# Patient Record
Sex: FEMALE | Race: WHITE | NOT HISPANIC OR LATINO | Employment: OTHER | URBAN - METROPOLITAN AREA
[De-identification: names, ages, dates, MRNs, and addresses within clinical notes are randomized per-mention and may not be internally consistent; named-entity substitution may affect disease eponyms.]

---

## 2017-03-07 ENCOUNTER — IMPORTED ENCOUNTER (OUTPATIENT)
Dept: URBAN - METROPOLITAN AREA CLINIC 43 | Facility: CLINIC | Age: 67
End: 2017-03-07

## 2017-03-07 PROBLEM — H25.13: Noted: 2017-03-07

## 2017-03-07 PROBLEM — H43.813: Noted: 2017-03-07

## 2017-04-07 ENCOUNTER — IMPORTED ENCOUNTER (OUTPATIENT)
Dept: URBAN - METROPOLITAN AREA CLINIC 43 | Facility: CLINIC | Age: 67
End: 2017-04-07

## 2017-04-07 PROBLEM — H25.13: Noted: 2017-04-07

## 2017-04-07 PROBLEM — H43.813: Noted: 2017-04-07

## 2017-07-26 NOTE — PATIENT DISCUSSION
MOST LIKELY AMD/ERM ARE CONTRIBUTING TO HER DECREASED VA, ALTHOUGH ERM IS NOT SIG ENOUGHT TO [facility].

## 2018-03-26 ENCOUNTER — IMPORTED ENCOUNTER (OUTPATIENT)
Dept: URBAN - METROPOLITAN AREA CLINIC 43 | Facility: CLINIC | Age: 68
End: 2018-03-26

## 2018-03-26 PROBLEM — H25.13: Noted: 2018-03-26

## 2018-03-26 PROBLEM — H43.813: Noted: 2018-03-26

## 2019-04-24 ENCOUNTER — IMPORTED ENCOUNTER (OUTPATIENT)
Dept: URBAN - METROPOLITAN AREA CLINIC 43 | Facility: CLINIC | Age: 69
End: 2019-04-24

## 2019-04-24 PROBLEM — H43.813: Noted: 2019-04-24

## 2019-04-24 PROBLEM — H25.13: Noted: 2019-04-24

## 2019-05-02 NOTE — PROCEDURE NOTE: CLINICAL

## 2019-06-06 NOTE — PROCEDURE NOTE: CLINICAL

## 2019-07-11 NOTE — PROCEDURE NOTE: CLINICAL
PROCEDURE NOTE: Lucentis 0.5mg PFS OS. Diagnosis: Neovascular AMD with Active CNV. Anesthesia: Topical. Prep: Betadine Flush. Prior to injection, risks/benefits/alternatives discussed including but not limited to infection, loss of vision or eye, hemorrhage, cataract, glaucoma, retinal tears or detachment. The patient wished to proceed with treatment. Topical anesthesia was induced with Alcaine. Additional anesthesia was achieved using drop(s) or injection checked above. A drop of Povidone-iodine 5% ophthalmic solution was instilled over the injection site and in the inferior fornix. Betadine prep was performed. A single use prefilled syringe of intravitreal Lucentis 0.5mg/0.05ml was used and excess discarded. The needle was passed 3.0 mm posterior to the limbus in pseudophakic patients, and 3.5 mm posterior to the limbus in phakic patients. The remainder of the Lucentis 0.5mg in the single-use vial was then discarded in a medical waste disposal container. The eye was irrigated with sterile irrigating solution. Patient tolerated the procedure well. There were no complications. Post procedure instructions given. CF vision checked. Injection Time 11:30 AM. The patient was instructed to return for re-evaluation in approximately 4-12 weeks depending on his/her condition and was told to call immediately if vision decreases and/or if his/her eye becomes red, painful, and/or light sensitive. The patient was instructed to go to the emergency room or call 911 if unable to reach the doctor within an hour or two of trying or calling. Rachel Montoya

## 2019-08-12 NOTE — PROCEDURE NOTE: CLINICAL
PROCEDURE NOTE: Lucentis 0.5mg PFS OS. Diagnosis: Neovascular AMD with Active CNV. Anesthesia: Topical. Prep: Betadine Flush. Prior to injection, risks/benefits/alternatives discussed including but not limited to infection, loss of vision or eye, hemorrhage, cataract, glaucoma, retinal tears or detachment. The patient wished to proceed with treatment. Topical anesthesia was induced with Alcaine. Additional anesthesia was achieved using drop(s) or injection checked above. A drop of Povidone-iodine 5% ophthalmic solution was instilled over the injection site and in the inferior fornix. Betadine prep was performed. A single use prefilled syringe of intravitreal Lucentis 0.5mg/0.05ml was used and excess discarded. The needle was passed 3.0 mm posterior to the limbus in pseudophakic patients, and 3.5 mm posterior to the limbus in phakic patients. The remainder of the Lucentis 0.5mg in the single-use vial was then discarded in a medical waste disposal container. The eye was irrigated with sterile irrigating solution. Patient tolerated the procedure well. There were no complications. Post procedure instructions given. CF vision checked. Injection Time *. The patient was instructed to return for re-evaluation in approximately 4-12 weeks depending on his/her condition and was told to call immediately if vision decreases and/or if his/her eye becomes red, painful, and/or light sensitive. The patient was instructed to go to the emergency room or call 911 if unable to reach the doctor within an hour or two of trying or calling. Megan Benjamin

## 2019-09-26 NOTE — PROCEDURE NOTE: CLINICAL
PROCEDURE NOTE: Lucentis 0.5mg PFS OS. Diagnosis: Neovascular AMD with Active CNV. Anesthesia: Topical. Prep: Betadine Flush. Prior to injection, risks/benefits/alternatives discussed including but not limited to infection, loss of vision or eye, hemorrhage, cataract, glaucoma, retinal tears or detachment. The patient wished to proceed with treatment. Topical anesthesia was induced with Alcaine. Additional anesthesia was achieved using drop(s) or injection checked above. A drop of Povidone-iodine 5% ophthalmic solution was instilled over the injection site and in the inferior fornix. Betadine prep was performed. A single use prefilled syringe of intravitreal Lucentis 0.5mg/0.05ml was used and excess discarded. The needle was passed 3.0 mm posterior to the limbus in pseudophakic patients, and 3.5 mm posterior to the limbus in phakic patients. The remainder of the Lucentis 0.5mg in the single-use vial was then discarded in a medical waste disposal container. The eye was irrigated with sterile irrigating solution. Patient tolerated the procedure well. There were no complications. Post procedure instructions given. CF vision checked. Injection Time 10:56 am. The patient was instructed to return for re-evaluation in approximately 4-12 weeks depending on his/her condition and was told to call immediately if vision decreases and/or if his/her eye becomes red, painful, and/or light sensitive. The patient was instructed to go to the emergency room or call 911 if unable to reach the doctor within an hour or two of trying or calling. Yoan Hall

## 2019-11-21 NOTE — PROCEDURE NOTE: CLINICAL
PROCEDURE NOTE: Lucentis 0.5mg PFS OS. Diagnosis: Neovascular AMD with Active CNV. Anesthesia: Topical. Prep: Betadine Flush. Prior to injection, risks/benefits/alternatives discussed including but not limited to infection, loss of vision or eye, hemorrhage, cataract, glaucoma, retinal tears or detachment. The patient wished to proceed with treatment. Topical anesthesia was induced with Alcaine. Additional anesthesia was achieved using drop(s) or injection checked above. A drop of Povidone-iodine 5% ophthalmic solution was instilled over the injection site and in the inferior fornix. Betadine prep was performed. A single use prefilled syringe of intravitreal Lucentis 0.5mg/0.05ml was used and excess discarded. The needle was passed 3.0 mm posterior to the limbus in pseudophakic patients, and 3.5 mm posterior to the limbus in phakic patients. The remainder of the Lucentis 0.5mg in the single-use vial was then discarded in a medical waste disposal container. The eye was irrigated with sterile irrigating solution. Patient tolerated the procedure well. There were no complications. Post procedure instructions given. CF vision checked. Injection Time 11:35 AM. The patient was instructed to return for re-evaluation in approximately 4-12 weeks depending on his/her condition and was told to call immediately if vision decreases and/or if his/her eye becomes red, painful, and/or light sensitive. The patient was instructed to go to the emergency room or call 911 if unable to reach the doctor within an hour or two of trying or calling. Roby Hendrix

## 2020-01-14 NOTE — PROCEDURE NOTE: CLINICAL
PROCEDURE NOTE: Lucentis 0.5mg PFS OS. Diagnosis: Neovascular AMD with Active CNV. Anesthesia: Lidocaine. Prep: Betadine Flush. Prior to injection, risks/benefits/alternatives discussed including but not limited to infection, loss of vision or eye, hemorrhage, cataract, glaucoma, retinal tears or detachment. The patient wished to proceed with treatment. Topical anesthesia was induced with Alcaine. Additional anesthesia was achieved using drop(s) or injection checked above. A drop of Povidone-iodine 5% ophthalmic solution was instilled over the injection site and in the inferior fornix. Betadine prep was performed. A single use prefilled syringe of intravitreal Lucentis 0.5mg/0.05ml was used and excess was disposed of as waste. The needle was passed 3.0 mm posterior to the limbus in pseudophakic patients, and 3.5 mm posterior to the limbus in phakic patients. Injection Time 10:31AM. Patient tolerated the procedure well. There were no complications. The eye was irrigated with sterile irrigating solution. Post procedure instructions given. The patient was instructed to use Artificial Tears q.i.d. p.r.n for comfort. The patient was instructed to return for re-evaluation in approximately 4-12 weeks depending on his/her condition and was told to call immediately if vision decreases and/or if his/her eye becomes red, painful, and/or light sensitive. The patient was instructed to go to the emergency room or call 911 if unable to reach the doctor within an hour or two of trying or calling. Luis Cabrera

## 2020-03-04 NOTE — PATIENT DISCUSSION
New Prescription: prednisoln iy-jyszftnr-lgggmid (prednisoln tx-hmempxiz-cgjvrxj): drops: 1-0.5-0.075% 1 drop three times a day as directed into right eye 03-

## 2020-03-04 NOTE — PATIENT DISCUSSION
DIABETES WITHOUT RETINOPATHY: RETURN FOR FOLLOW-UP AS SCHEDULED FOR DILATED EXAM. DISPLAY PLAN FREE TEXT

## 2020-04-19 ASSESSMENT — TONOMETRY
OS_IOP_MMHG: 15.0
OD_IOP_MMHG: 16.0
OD_IOP_MMHG: 18.0
OD_IOP_MMHG: 18.0
OS_IOP_MMHG: 18.0
OS_IOP_MMHG: 16.0
OD_IOP_MMHG: 16.0
OS_IOP_MMHG: 17.0

## 2020-04-19 ASSESSMENT — VISUAL ACUITY
OS_SC: 20/80
OD_SC: J16
OS_SC: J16
OS_CC: 20/20 -2
OD_SC: 20/70 -2
OS_SC: 20/60 -1
OS_OTHER: 20/400.
OD_CC: J1
OD_CC: 20/25 +2
OD_SC: 20/100
OD_OTHER: 20/400.
OS_OTHER: 20/400.
OD_PH: 20/30 -
OD_SC: J16
OS_CC: 20/25 -2
OS_CC: J1+
OD_SC: 20/200
OS_PH: 20/25 -
OD_CC: 20/20-2
OD_OTHER: 20/400.
OS_SC: J16
OS_CC: J1+2
OD_CC: J1+
OD_CC: 20/30 +2
OS_SC: 20/200
OS_CC: 20/20

## 2020-04-19 ASSESSMENT — KERATOMETRY
OD_K1POWER_DIOPTERS: 44
OS_K2POWER_DIOPTERS: 44.25
OD_AXISANGLE_DEGREES: 180
OD_K1POWER_DIOPTERS: 43.75
OD_AXISANGLE_DEGREES: 90
OS_K2POWER_DIOPTERS: 44.5
OS_AXISANGLE2_DEGREES: 175
OS_K1POWER_DIOPTERS: 44.5
OD_K2POWER_DIOPTERS: 44
OD_K2POWER_DIOPTERS: 43.75
OS_AXISANGLE_DEGREES: 5
OS_K1POWER_DIOPTERS: 44.25
OS_AXISANGLE2_DEGREES: 95
OD_AXISANGLE2_DEGREES: 90
OD_AXISANGLE2_DEGREES: 180
OS_AXISANGLE_DEGREES: 85

## 2020-05-19 NOTE — PROCEDURE NOTE: CLINICAL
PROCEDURE NOTE: Lucentis 0.5mg PFS OS. Diagnosis: Neovascular AMD with Active CNV. Prior to injection, risks/benefits/alternatives discussed including but not limited to infection, loss of vision or eye, hemorrhage, cataract, glaucoma, retinal tears or detachment. The patient wished to proceed with treatment. Topical anesthesia was induced with Alcaine. Additional anesthesia was achieved using drop(s) or injection checked above. A drop of Povidone-iodine 5% ophthalmic solution was instilled over the injection site and in the inferior fornix. Betadine prep was performed. A single use prefilled syringe of intravitreal Lucentis 0.5mg/0.05ml was used and excess was disposed of as waste. The needle was passed 3.0 mm posterior to the limbus in pseudophakic patients, and 3.5 mm posterior to the limbus in phakic patients. Injection Time *. Patient tolerated the procedure well. There were no complications. The eye was irrigated with sterile irrigating solution. Post procedure instructions given. The patient was instructed to use Artificial Tears q.i.d. p.r.n for comfort. The patient was instructed to return for re-evaluation in approximately 4-12 weeks depending on his/her condition and was told to call immediately if vision decreases and/or if his/her eye becomes red, painful, and/or light sensitive. The patient was instructed to go to the emergency room or call 911 if unable to reach the doctor within an hour or two of trying or calling. Natali Olson

## 2020-05-19 NOTE — PATIENT DISCUSSION
LUCENTIS - PROM EDEMA AT 4 MONTHS  - INCREASED - PT COULD NOT COME IN 2ND BEING IN THE HOSPITAL  - RETX AND REEVAL IN 4-6 WKS.

## 2020-06-17 NOTE — PATIENT DISCUSSION
Continue: prednisoln ho-htqcamak-hxqgbmn (prednisoln nj-plknyzbj-qbhnsxz): drops: 1-0.5-0.075% 1 drop three times a day as directed into right eye 03-

## 2020-06-17 NOTE — PATIENT DISCUSSION
S/P PC IOL, OD. STABLE. CONTINUE DROPS AS DIRECTED. RETURN FOR FOLLOW-UP AS SCHEDULED WITH DR. PARKER.

## 2020-06-24 NOTE — PATIENT DISCUSSION
Continue as directed in right eye until first bottle runs out. Start second bottle in left eye  two days prior to surgery and continue as directed.

## 2020-06-24 NOTE — PATIENT DISCUSSION
Continue: prednisoln by-zxjrjhtu-kvcwhge (prednisoln pd-khzqlhml-jnvgyqv): drops: 1-0.5-0.075% 1 drop three times a day as directed into both eyes 03-

## 2020-06-25 ENCOUNTER — ESTABLISHED COMPREHENSIVE EXAM (OUTPATIENT)
Dept: URBAN - METROPOLITAN AREA CLINIC 32 | Facility: CLINIC | Age: 70
End: 2020-06-25

## 2020-06-25 DIAGNOSIS — H10.13: ICD-10-CM

## 2020-06-25 PROCEDURE — 92014 COMPRE OPH EXAM EST PT 1/>: CPT

## 2020-06-25 ASSESSMENT — KERATOMETRY
OD_AXISANGLE_DEGREES: 7
OD_AXISANGLE2_DEGREES: 97
OS_K2POWER_DIOPTERS: 44
OS_K1POWER_DIOPTERS: 43.75
OS_AXISANGLE_DEGREES: 14
OS_AXISANGLE2_DEGREES: 104
OD_K2POWER_DIOPTERS: 44.5
OD_K1POWER_DIOPTERS: 44

## 2020-06-25 ASSESSMENT — VISUAL ACUITY
OS_SC: J16
OS_CC: J1+
OS_SC: 20/200
OD_CC: 20/25
OS_GLARE: 20/400
OD_GLARE: 20/400
OD_CC: J1
OD_SC: J16
OD_SC: 20/200
OS_CC: 20/20

## 2020-06-25 ASSESSMENT — TONOMETRY
OD_IOP_MMHG: 18
OS_IOP_MMHG: 15

## 2020-06-25 NOTE — PROCEDURE NOTE: CLINICAL
PROCEDURE NOTE: Lucentis 0.5mg PFS OS. Diagnosis: Neovascular AMD with Active CNV. Prior to injection, risks/benefits/alternatives discussed including but not limited to infection, loss of vision or eye, hemorrhage, cataract, glaucoma, retinal tears or detachment. The patient wished to proceed with treatment. Topical anesthesia was induced with Alcaine. Additional anesthesia was achieved using drop(s) or injection checked above. A drop of Povidone-iodine 5% ophthalmic solution was instilled over the injection site and in the inferior fornix. Betadine prep was performed. A single use prefilled syringe of intravitreal Lucentis 0.5mg/0.05ml was used and excess was disposed of as waste. The needle was passed 3.0 mm posterior to the limbus in pseudophakic patients, and 3.5 mm posterior to the limbus in phakic patients. Injection Time *. Patient tolerated the procedure well. There were no complications. The eye was irrigated with sterile irrigating solution. Post procedure instructions given. The patient was instructed to use Artificial Tears q.i.d. p.r.n for comfort. The patient was instructed to return for re-evaluation in approximately 4-12 weeks depending on his/her condition and was told to call immediately if vision decreases and/or if his/her eye becomes red, painful, and/or light sensitive. The patient was instructed to go to the emergency room or call 911 if unable to reach the doctor within an hour or two of trying or calling. Pedro Cruz

## 2020-06-25 NOTE — PATIENT DISCUSSION
5 19 20 LUCENTIS - PROM EDEMA AT 4 MONTHS  - INCREASED - PT COULD NOT COME IN 2ND BEING IN THE HOSPITAL  - RETX AND REEVAL IN 4-6 WKS.

## 2020-07-01 NOTE — PATIENT DISCUSSION
Continue as directed in right eye until first bottle runs out. Continue as directed in left eye until 2nd bottle runs out.

## 2020-07-01 NOTE — PATIENT DISCUSSION
Continue: prednisoln js-thekqsld-wucyqvp (prednisoln yl-zorqgvlt-tmjpise): drops: 1-0.5-0.075% 1 drop three times a day as directed into both eyes 03-

## 2020-07-01 NOTE — PATIENT DISCUSSION
S/P PC IOL, OS: GOOD POST OP RESULT. CONTINUE COMBINATION DROP UNTIL BOTTLE RUNS OUT. RETURN FOR FOLLOW-UP IN 2 WEEKS.

## 2020-07-01 NOTE — PATIENT DISCUSSION
Post-Op Instructions: The patient was instructed in the proper use of post-operative eye drops continue as directed. Call back instructions, retinal detachment and endophthalmitis precautions given.

## 2020-08-25 NOTE — PROCEDURE NOTE: CLINICAL
PROCEDURE NOTE: Lucentis 0.5mg PFS OS. Diagnosis: Neovascular AMD with Active CNV. Prior to injection, risks/benefits/alternatives discussed including but not limited to infection, loss of vision or eye, hemorrhage, cataract, glaucoma, retinal tears or detachment. The patient wished to proceed with treatment. Topical anesthesia was induced with Alcaine. Additional anesthesia was achieved using drop(s) or injection checked above. A drop of Povidone-iodine 5% ophthalmic solution was instilled over the injection site and in the inferior fornix. Betadine prep was performed. A single use prefilled syringe of intravitreal Lucentis 0.5mg/0.05ml was used and excess was disposed of as waste. The needle was passed 3.0 mm posterior to the limbus in pseudophakic patients, and 3.5 mm posterior to the limbus in phakic patients. Injection Time 10:45AM. Patient tolerated the procedure well. There were no complications. The eye was irrigated with sterile irrigating solution. Post procedure instructions given. The patient was instructed to use Artificial Tears q.i.d. p.r.n for comfort. The patient was instructed to return for re-evaluation in approximately 4-12 weeks depending on his/her condition and was told to call immediately if vision decreases and/or if his/her eye becomes red, painful, and/or light sensitive. The patient was instructed to go to the emergency room or call 911 if unable to reach the doctor within an hour or two of trying or calling. Regina Stacy

## 2020-10-22 NOTE — PROCEDURE NOTE: CLINICAL
PROCEDURE NOTE: Lucentis 0.5mg PFS OS. Diagnosis: Neovascular AMD with Active CNV. Prior to injection, risks/benefits/alternatives discussed including but not limited to infection, loss of vision or eye, hemorrhage, cataract, glaucoma, retinal tears or detachment. The patient wished to proceed with treatment. Topical anesthesia was induced with Alcaine. Additional anesthesia was achieved using drop(s) or injection checked above. A drop of Povidone-iodine 5% ophthalmic solution was instilled over the injection site and in the inferior fornix. Betadine prep was performed. A single use prefilled syringe of intravitreal Lucentis 0.5mg/0.05ml was used and excess was disposed of as waste. The needle was passed 3.0 mm posterior to the limbus in pseudophakic patients, and 3.5 mm posterior to the limbus in phakic patients. Injection Time 10:55AM. Patient tolerated the procedure well. There were no complications. The eye was irrigated with sterile irrigating solution. Post procedure instructions given. The patient was instructed to use Artificial Tears q.i.d. p.r.n for comfort. The patient was instructed to return for re-evaluation in approximately 4-12 weeks depending on his/her condition and was told to call immediately if vision decreases and/or if his/her eye becomes red, painful, and/or light sensitive. The patient was instructed to go to the emergency room or call 911 if unable to reach the doctor within an hour or two of trying or calling. Inez Minor

## 2020-11-11 NOTE — PATIENT DISCUSSION
My findings and recommendations TODAY are based on the patient's symptoms, exam, diagnostic testing and records. Yes

## 2020-11-19 NOTE — PATIENT DISCUSSION
If no improvement after 2 weeks, Patient instructed to see DR Dimple Alvarez for further evaluation.

## 2020-12-15 NOTE — PATIENT DISCUSSION
If no improvement after 2 weeks, Patient instructed to see DR Dereck Haskins for further evaluation.

## 2020-12-15 NOTE — PROCEDURE NOTE: CLINICAL

## 2021-01-14 NOTE — PROCEDURE NOTE: CLINICAL
PROCEDURE NOTE: Eylea PFS OD. Diagnosis: Neovascular AMD with Active CNV. Anesthesia: Topical. Prep: Betadine Drops. Prior to injection, risks/benefits/alternatives discussed including but not limited to infection, loss of vision or eye, hemorrhage, cataract, glaucoma, retinal tears or detachment. The patient wished to proceed with treatment. Betadine prep was performed. Topical anesthesia was induced with Alcaine. Additional anesthesia was achieved using drop(s) or injection checked above. A drop of Povidone-iodine 5% ophthalmic solution was instilled over the injection site and in the inferior fornix. A single use prefilled syringe of intravitreal Eylea 2mg/0.05ml was used and excess was disposed of as waste. The needle was passed 3.0 mm posterior to the limbus in pseudophakic patients, and 3.5 mm posterior to the limbus in phakic patients. Injection time: 1155A. Patient tolerated procedure well. There were no complications. The eye was irrigated with sterile irrigating solution. Post procedure instructions given. The patient was instructed to use Artificial Tears q.i.d. p.r.n for comfort. The patient was instructed to return for re-evaluation in approximately 4-12 weeks depending on his/her condition and was told to call immediately if vision decreases and/or if his/her eye becomes red, painful, and/or light sensitive. The patient was instructed to go to the emergency room or call 911 if unable to reach the doctor within an hour or two of trying or calling. Gabriela Álvarez

## 2021-01-14 NOTE — PATIENT DISCUSSION
If no improvement after 2 weeks, Patient instructed to see DR Zoraida Ramirez for further evaluation.

## 2021-02-18 NOTE — PROCEDURE NOTE: CLINICAL
PROCEDURE NOTE: Lucentis 0.5mg PFS OS. Diagnosis: Neovascular AMD with Active CNV. Anesthesia: Subconjunctival. Prep: Betadine Flush. Prior to injection, risks/benefits/alternatives discussed including but not limited to infection, loss of vision or eye, hemorrhage, cataract, glaucoma, retinal tears or detachment. The patient wished to proceed with treatment. Topical anesthesia was induced with Alcaine. Additional anesthesia was achieved using drop(s) or injection checked above. A drop of Povidone-iodine 5% ophthalmic solution was instilled over the injection site and in the inferior fornix. Betadine prep was performed. A single use prefilled syringe of intravitreal Lucentis 0.5mg/0.05ml was used and excess was disposed of as waste. The needle was passed 3.0 mm posterior to the limbus in pseudophakic patients, and 3.5 mm posterior to the limbus in phakic patients. Injection Time *. Patient tolerated the procedure well. There were no complications. The eye was irrigated with sterile irrigating solution. Post procedure instructions given. The patient was instructed to use Artificial Tears q.i.d. p.r.n for comfort. The patient was instructed to return for re-evaluation in approximately 4-12 weeks depending on his/her condition and was told to call immediately if vision decreases and/or if his/her eye becomes red, painful, and/or light sensitive. The patient was instructed to go to the emergency room or call 911 if unable to reach the doctor within an hour or two of trying or calling. Vivianasevero Flores PROCEDURE NOTE: Eylea PFS OD. Diagnosis: Neovascular AMD with Active CNV. Anesthesia: Topical. Prep: Betadine Drops. Prior to injection, risks/benefits/alternatives discussed including but not limited to infection, loss of vision or eye, hemorrhage, cataract, glaucoma, retinal tears or detachment. The patient wished to proceed with treatment. Betadine prep was performed. Topical anesthesia was induced with Alcaine. Additional anesthesia was achieved using drop(s) or injection checked above. A drop of Povidone-iodine 5% ophthalmic solution was instilled over the injection site and in the inferior fornix. A single use prefilled syringe of intravitreal Eylea 2mg/0.05ml was used and excess was disposed of as waste. The needle was passed 3.0 mm posterior to the limbus in pseudophakic patients, and 3.5 mm posterior to the limbus in phakic patients. Injection time: 12:00PM.  Patient tolerated procedure well. There were no complications. The eye was irrigated with sterile irrigating solution. Post procedure instructions given. The patient was instructed to use Artificial Tears q.i.d. p.r.n for comfort. The patient was instructed to return for re-evaluation in approximately 4-12 weeks depending on his/her condition and was told to call immediately if vision decreases and/or if his/her eye becomes red, painful, and/or light sensitive. The patient was instructed to go to the emergency room or call 911 if unable to reach the doctor within an hour or two of trying or calling. Sudheer Flores

## 2021-04-01 NOTE — PROCEDURE NOTE: CLINICAL
PROCEDURE NOTE: Eylea PFS OD. Diagnosis: Neovascular AMD with Active CNV. Anesthesia: Subconjunctival. Prep: Betadine Drops. Prior to injection, risks/benefits/alternatives discussed including but not limited to infection, loss of vision or eye, hemorrhage, cataract, glaucoma, retinal tears or detachment. The patient wished to proceed with treatment. Betadine prep was performed. Topical anesthesia was induced with Alcaine. Additional anesthesia was achieved using drop(s) or injection checked above. A drop of Povidone-iodine 5% ophthalmic solution was instilled over the injection site and in the inferior fornix. A single use prefilled syringe of intravitreal Eylea 2mg/0.05ml was used and excess was disposed of as waste. The needle was passed 3.0 mm posterior to the limbus in pseudophakic patients, and 3.5 mm posterior to the limbus in phakic patients. Injection time: 9:30 AM.  Patient tolerated procedure well. There were no complications. The eye was irrigated with sterile irrigating solution. Post procedure instructions given. The patient was instructed to use Artificial Tears q.i.d. p.r.n for comfort. The patient was instructed to return for re-evaluation in approximately 4-12 weeks depending on his/her condition and was told to call immediately if vision decreases and/or if his/her eye becomes red, painful, and/or light sensitive. The patient was instructed to go to the emergency room or call 911 if unable to reach the doctor within an hour or two of trying or calling. Rosita Rich PROCEDURE NOTE: Lucentis 0.5mg PFS OS. Diagnosis: Neovascular AMD with Active CNV. Anesthesia: Subconjunctival. Prep: Betadine Flush. Prior to injection, risks/benefits/alternatives discussed including but not limited to infection, loss of vision or eye, hemorrhage, cataract, glaucoma, retinal tears or detachment. The patient wished to proceed with treatment. Topical anesthesia was induced with Alcaine. Additional anesthesia was achieved using drop(s) or injection checked above. A drop of Povidone-iodine 5% ophthalmic solution was instilled over the injection site and in the inferior fornix. Betadine prep was performed. A single use prefilled syringe of intravitreal Lucentis 0.5mg/0.05ml was used and excess was disposed of as waste. The needle was passed 3.0 mm posterior to the limbus in pseudophakic patients, and 3.5 mm posterior to the limbus in phakic patients. Injection Time 9:45 AM. Patient tolerated the procedure well. There were no complications. The eye was irrigated with sterile irrigating solution. Post procedure instructions given. The patient was instructed to use Artificial Tears q.i.d. p.r.n for comfort. The patient was instructed to return for re-evaluation in approximately 4-12 weeks depending on his/her condition and was told to call immediately if vision decreases and/or if his/her eye becomes red, painful, and/or light sensitive. The patient was instructed to go to the emergency room or call 911 if unable to reach the doctor within an hour or two of trying or calling. Rosita Rich

## 2021-04-01 NOTE — PATIENT DISCUSSION
If no improvement after 2 weeks, Patient instructed to see DR Johnny Waldrop for further evaluation.

## 2021-04-01 NOTE — PATIENT DISCUSSION
LUCENTIS AND EXTEND TO 7 AND REPEAT X 2 DOI  - TRACE EDEMA AT 6 WK - TX AND EXTEND  TO 7 WKS AS PT DOING BETTER WITH SHORTER F/U.  TO TRY AN EXTRA WEEK EXTENSION.

## 2021-05-20 NOTE — PROCEDURE NOTE: CLINICAL
PROCEDURE NOTE: Eylea PFS OD. Diagnosis: Neovascular AMD with Active CNV. Anesthesia: Topical/Subconjunctival. Prep: Betadine Drops. Prior to injection, risks/benefits/alternatives discussed including but not limited to infection, loss of vision or eye, hemorrhage, cataract, glaucoma, retinal tears or detachment. The patient wished to proceed with treatment. Betadine prep was performed. Topical anesthesia was induced with Alcaine. Additional anesthesia was achieved using drop(s) or injection checked above. A drop of Povidone-iodine 5% ophthalmic solution was instilled over the injection site and in the inferior fornix. A single use prefilled syringe of intravitreal Eylea 2mg/0.05ml was used and excess was disposed of as waste. The needle was passed 3.0 mm posterior to the limbus in pseudophakic patients, and 3.5 mm posterior to the limbus in phakic patients. Injection time: 230PM.  Patient tolerated procedure well. There were no complications. The eye was irrigated with sterile irrigating solution. Post procedure instructions given. The patient was instructed to use Artificial Tears q.i.d. p.r.n for comfort. The patient was instructed to return for re-evaluation in approximately 4-12 weeks depending on his/her condition and was told to call immediately if vision decreases and/or if his/her eye becomes red, painful, and/or light sensitive. The patient was instructed to go to the emergency room or call 911 if unable to reach the doctor within an hour or two of trying or calling. Rosita Rich PROCEDURE NOTE: Lucentis 0.5mg PFS OS. Diagnosis: Neovascular AMD with Active CNV. Anesthesia: Lidocaine. Prep: Betadine Flush. Prior to injection, risks/benefits/alternatives discussed including but not limited to infection, loss of vision or eye, hemorrhage, cataract, glaucoma, retinal tears or detachment. The patient wished to proceed with treatment. Topical anesthesia was induced with Alcaine. Additional anesthesia was achieved using drop(s) or injection checked above. A drop of Povidone-iodine 5% ophthalmic solution was instilled over the injection site and in the inferior fornix. Betadine prep was performed. A single use prefilled syringe of intravitreal Lucentis 0.5mg/0.05ml was used and excess was disposed of as waste. The needle was passed 3.0 mm posterior to the limbus in pseudophakic patients, and 3.5 mm posterior to the limbus in phakic patients. Injection Time 330. Patient tolerated the procedure well. There were no complications. The eye was irrigated with sterile irrigating solution. Post procedure instructions given. The patient was instructed to use Artificial Tears q.i.d. p.r.n for comfort. The patient was instructed to return for re-evaluation in approximately 4-12 weeks depending on his/her condition and was told to call immediately if vision decreases and/or if his/her eye becomes red, painful, and/or light sensitive. The patient was instructed to go to the emergency room or call 911 if unable to reach the doctor within an hour or two of trying or calling. Rosita Rich

## 2021-05-20 NOTE — PATIENT DISCUSSION
LUCENTIS AND EXTEND TO 7 AND REPEAT X 2  - TRACE EDEMA AT 6 WK - TX AND EXTEND  TO 7 WKS AS PT DOING BETTER WITH SHORTER F/U.  TO TRY AN EXTRA WEEK EXTENSION.

## 2021-05-20 NOTE — PATIENT DISCUSSION
If no improvement after 2 weeks, Patient instructed to see DR Beatrice Jaime for further evaluation.

## 2021-07-07 NOTE — PROCEDURE NOTE: CLINICAL
PROCEDURE NOTE: Eylea PFS OD. Diagnosis: Neovascular AMD with Active CNV. Anesthesia: Subconjunctival. Prep: Betadine Drops. Prior to injection, risks/benefits/alternatives discussed including but not limited to infection, loss of vision or eye, hemorrhage, cataract, glaucoma, retinal tears or detachment. The patient wished to proceed with treatment. Betadine prep was performed. Topical anesthesia was induced with Alcaine. Additional anesthesia was achieved using drop(s) or injection checked above. A drop of Povidone-iodine 5% ophthalmic solution was instilled over the injection site and in the inferior fornix. A single use prefilled syringe of intravitreal Eylea 2mg/0.05ml was used and excess was disposed of as waste. The needle was passed 3.0 mm posterior to the limbus in pseudophakic patients, and 3.5 mm posterior to the limbus in phakic patients. Injection time: 906AM.  Patient tolerated procedure well. There were no complications. The eye was irrigated with sterile irrigating solution. Post procedure instructions given. The patient was instructed to use Artificial Tears q.i.d. p.r.n for comfort. The patient was instructed to return for re-evaluation in approximately 4-12 weeks depending on his/her condition and was told to call immediately if vision decreases and/or if his/her eye becomes red, painful, and/or light sensitive. The patient was instructed to go to the emergency room or call 911 if unable to reach the doctor within an hour or two of trying or calling. Pio Calvin PROCEDURE NOTE: Lucentis 0.5mg PFS OS. Diagnosis: Neovascular AMD with Active CNV. Anesthesia: Subconjunctival. Prep: Betadine Flush. Prior to injection, risks/benefits/alternatives discussed including but not limited to infection, loss of vision or eye, hemorrhage, cataract, glaucoma, retinal tears or detachment. The patient wished to proceed with treatment. Topical anesthesia was induced with Alcaine. Additional anesthesia was achieved using drop(s) or injection checked above. A drop of Povidone-iodine 5% ophthalmic solution was instilled over the injection site and in the inferior fornix. Betadine prep was performed. A single use prefilled syringe of intravitreal Lucentis 0.5mg/0.05ml was used and excess was disposed of as waste. The needle was passed 3.0 mm posterior to the limbus in pseudophakic patients, and 3.5 mm posterior to the limbus in phakic patients. Injection Time 905AM. Patient tolerated the procedure well. There were no complications. The eye was irrigated with sterile irrigating solution. Post procedure instructions given. The patient was instructed to use Artificial Tears q.i.d. p.r.n for comfort. The patient was instructed to return for re-evaluation in approximately 4-12 weeks depending on his/her condition and was told to call immediately if vision decreases and/or if his/her eye becomes red, painful, and/or light sensitive. The patient was instructed to go to the emergency room or call 911 if unable to reach the doctor within an hour or two of trying or calling. Pio Calvin

## 2021-09-07 NOTE — PROCEDURE NOTE: CLINICAL
PROCEDURE NOTE: Eylea PFS OD. Diagnosis: Neovascular AMD with Active CNV. Anesthesia: Topical/Subconjunctival. Prep: Betadine Drops. Prior to injection, risks/benefits/alternatives discussed including but not limited to infection, loss of vision or eye, hemorrhage, cataract, glaucoma, retinal tears or detachment. The patient wished to proceed with treatment. Betadine prep was performed. Topical anesthesia was induced with Alcaine. Additional anesthesia was achieved using drop(s) or injection checked above. A drop of Povidone-iodine 5% ophthalmic solution was instilled over the injection site and in the inferior fornix. A single use prefilled syringe of intravitreal Eylea 2mg/0.05ml was used and excess was disposed of as waste. The needle was passed 3.0 mm posterior to the limbus in pseudophakic patients, and 3.5 mm posterior to the limbus in phakic patients. Injection time: 12:30 PM.  Patient tolerated procedure well. There were no complications. The eye was irrigated with sterile irrigating solution. Post procedure instructions given. The patient was instructed to use Artificial Tears q.i.d. p.r.n for comfort. The patient was instructed to return for re-evaluation in approximately 4-12 weeks depending on his/her condition and was told to call immediately if vision decreases and/or if his/her eye becomes red, painful, and/or light sensitive. The patient was instructed to go to the emergency room or call 911 if unable to reach the doctor within an hour or two of trying or calling. Suzy Modi PROCEDURE NOTE: Lucentis 0.5mg PFS OS. Diagnosis: Neovascular AMD with Active CNV. Anesthesia: Lidocaine 4%. Prep: Betadine Flush. Prior to injection, risks/benefits/alternatives discussed including but not limited to infection, loss of vision or eye, hemorrhage, cataract, glaucoma, retinal tears or detachment. The patient wished to proceed with treatment. Topical anesthesia was induced with Alcaine. Additional anesthesia was achieved using drop(s) or injection checked above. A drop of Povidone-iodine 5% ophthalmic solution was instilled over the injection site and in the inferior fornix. Betadine prep was performed. A single use prefilled syringe of intravitreal Lucentis 0.5mg/0.05ml was used and excess was disposed of as waste. The needle was passed 3.0 mm posterior to the limbus in pseudophakic patients, and 3.5 mm posterior to the limbus in phakic patients. Injection Time 12:25 PM. Patient tolerated the procedure well. There were no complications. The eye was irrigated with sterile irrigating solution. Post procedure instructions given. The patient was instructed to use Artificial Tears q.i.d. p.r.n for comfort. The patient was instructed to return for re-evaluation in approximately 4-12 weeks depending on his/her condition and was told to call immediately if vision decreases and/or if his/her eye becomes red, painful, and/or light sensitive. The patient was instructed to go to the emergency room or call 911 if unable to reach the doctor within an hour or two of trying or calling. Suzy Modi

## 2021-09-07 NOTE — PATIENT DISCUSSION
7 9 21 TRACE EDEMA 9 WKS - RETX AND REDUCE BACK TO 7 WKS TO TRY AND CLEAR REMAINING EDEMA AND SINCE OD DOING WORSE W/ LONGER F/U.

## 2021-10-26 NOTE — PATIENT DISCUSSION
Patient Education     MEDICATION: ORAL CONTRACEPTIVE PILLS  Oral contraceptives (birth control pills) are used most often to prevent pregnancy or to regulate menstrual cycles. There are many brand names sold: Lo-Ovral, Ovral, Ortho-Novum, Norinyl, Loestrin, and others. They work by preventing the release of eggs from your ovaries. There are two types: combination pills (containing estrogen and progestin) and progestin-only pills. Birth control pills do not protect against HIV infection (the virus that causes AIDS) or other sexually transmitted diseases.  DIRECTIONS FOR USE:  1. The pills are provided in 21- and 28-day packages. The day you take the first pill is important. Read the directions that come with the pills or follow your doctor’s advice.  2. Pills are taken by mouth once a day. With 21-day packs, wait seven days after the last pill before starting a new pack. With the 28-day pack, take the pills daily without skipping days. Take the pill at the same time each day to help you remember (for example, when you wake up or when you go to bed).  3. Refill your prescription soon enough so you always have a new package available. Do not take the pill for more than 12 months without consulting a doctor.  4. Do not rely on birth control pills to prevent pregnancy during the first month that you use them. You must use another method of contraception during this time.  5. While taking birth control pills, you should have regular physical exams. Have them at least once a year or as often as your healthcare provider suggests.  6. When you stop birth control pills, you should use another method of contraception for at least two months before trying to get pregnant.  7. For combination pills, if you forget a dose, take two pills the day you remember. If you miss two doses, take two tablets daily for the next two days. Whenever you miss one or two pills, you must use another method of birth control until this packet is  IMPROVED 1 14 21. finished. If you forget three or more tablets, do not take any extra pills. Throw away the current packet, and start a new packet seven days after you took your last pill. Use another method of birth control until you have taken 14 pills from the new packet. For progestin-only pills, each pill must be taken within 3 hours of scheduled time. If it is over 3 hours, take the pill ASAP and take second pill at next scheduled time. A backup contraceptive should be used for at least 48 hours.  8. If you miss a period and you have taken all your pills on schedule, continue taking the pills. If you miss two periods in a row or if you missed one period and did not take your pills on schedule during the previous cycle, stop taking the pills and use another method of birth control until you have a pregnancy test. (Birth control pills may cause birth defects if taken during early pregnancy.)  9. If surgery is planned, notify your doctor that you are taking birth control pills. Stop taking them and use another method of birth control for one week before surgery to avoid increased risk of a blood clot.  WHAT TO WATCH FOR:  POSSIBLE MILD SIDE EFFECTS: Headache, nausea (Take the pill with food). Dizziness, breast tenderness, spotting or bleeding between periods are common when starting birth control pills and should stop after 3-6 cycles. (Contact your doctor or this facility if these symptoms do not go away after four months.)  POSSIBLE SERIOUS SIDE EFFECTS: Abdominal pain, swelling or pain in the legs, severe sharp pressure or pain in the chest, shortness of breath, coughing up blood, severe headache, sudden visual changes, unexpected heavy vaginal bleeding: Contact your doctor or return to this facility at once. Two missed periods in a row, lumps in the breast, unexpected weight gain: Contact your doctor.  Birth control pills cause a slight increase in risk of stroke, blood clots, high blood pressure, heart attack, gallbladder  disease, and vision problems. Cigarette smoking and being over 35 years of age further increase these risks.  While birth control pills may protect against cancer of the ovary and uterus, they may cause an increased risk for cancer of the cervix. Latest studies show that birth control pills do not increase the risk for breast cancer.  ---------- IMPORTANT ----------  MEDICAL CONDITIONS: Before starting this medicine, be sure your doctor knows if you have any of the following conditions:  · Pregnant or breastfeeding, asthma, high blood pressure, kidney, liver or heart disease, high blood pressure during pregnancy, severe fluid retention during your period, blood clots, heart attack, seizure, migraine headaches, breast cancer, high cholesterol, depression  DRUG INTERACTIONS: Before starting this medicine, be sure your doctor knows if you are taking any of the following drugs:  · Tegretol (carbamazepine), phenobarbital, Dilantin (phenytoin), rifampin (may decrease the effect of the birth control pills)  · Penicillin, ampicillin, amoxicillin, augmentin, sulfa drugs, tetracycline  · Coumadin (warfarin) may require an adjustment in the dose.  WARNINGS:  · If you have been prescribed penicillin, ampicillin, sulfa drugs, or tetracycline for an injection, these antibiotics may decrease the effect of birth control pills. Ask your doctor for a different antibiotic or use another method of birth control for the menstrual cycle(s) in which you take this medicine.  [NOTE: This information topic may not include all directions, precautions, medical conditions, drug/food interactions and warnings for this drug. Check with your doctor, nurse, or pharmacist for any questions that you may have.]  © 6963-3166 [facility], [address] [place] [address], [place] [address]. All rights reserved. This information is not intended as a substitute for professional medical care. Always follow your healthcare professional's instructions.

## 2021-10-26 NOTE — PROCEDURE NOTE: CLINICAL
PROCEDURE NOTE: Eylea PFS OD. Diagnosis: Neovascular AMD with Active CNV. Anesthesia: Topical/Subconjunctival. Prep: Betadine Drops. Prior to injection, risks/benefits/alternatives discussed including but not limited to infection, loss of vision or eye, hemorrhage, cataract, glaucoma, retinal tears or detachment. The patient wished to proceed with treatment. Betadine prep was performed. Topical anesthesia was induced with Alcaine. Additional anesthesia was achieved using drop(s) or injection checked above. A drop of Povidone-iodine 5% ophthalmic solution was instilled over the injection site and in the inferior fornix. A single use prefilled syringe of intravitreal Eylea 2mg/0.05ml was used and excess was disposed of as waste. The needle was passed 3.0 mm posterior to the limbus in pseudophakic patients, and 3.5 mm posterior to the limbus in phakic patients. Injection time: 10:00 AM.  Patient tolerated procedure well. There were no complications. The eye was irrigated with sterile irrigating solution. Post procedure instructions given. The patient was instructed to use Artificial Tears q.i.d. p.r.n for comfort. The patient was instructed to return for re-evaluation in approximately 4-12 weeks depending on his/her condition and was told to call immediately if vision decreases and/or if his/her eye becomes red, painful, and/or light sensitive. The patient was instructed to go to the emergency room or call 911 if unable to reach the doctor within an hour or two of trying or calling. Pio Calvin PROCEDURE NOTE: Lucentis 0.5mg PFS OS. Diagnosis: Neovascular AMD with Active CNV. Anesthesia: Topical/Subconjunctival. Prep: Betadine Flush. Prior to injection, risks/benefits/alternatives discussed including but not limited to infection, loss of vision or eye, hemorrhage, cataract, glaucoma, retinal tears or detachment. The patient wished to proceed with treatment. Topical anesthesia was induced with Alcaine. Additional anesthesia was achieved using drop(s) or injection checked above. A drop of Povidone-iodine 5% ophthalmic solution was instilled over the injection site and in the inferior fornix. Betadine prep was performed. A single use prefilled syringe of intravitreal Lucentis 0.5mg/0.05ml was used and excess was disposed of as waste. The needle was passed 3.0 mm posterior to the limbus in pseudophakic patients, and 3.5 mm posterior to the limbus in phakic patients. Injection Time 10:00 AM. Patient tolerated the procedure well. There were no complications. The eye was irrigated with sterile irrigating solution. Post procedure instructions given. The patient was instructed to use Artificial Tears q.i.d. p.r.n for comfort. The patient was instructed to return for re-evaluation in approximately 4-12 weeks depending on his/her condition and was told to call immediately if vision decreases and/or if his/her eye becomes red, painful, and/or light sensitive. The patient was instructed to go to the emergency room or call 911 if unable to reach the doctor within an hour or two of trying or calling. Pio Calvin

## 2021-12-14 NOTE — PROCEDURE NOTE: CLINICAL
PROCEDURE NOTE: Eylea PFS OD. Diagnosis: Neovascular AMD with Active CNV. Anesthesia: Lidocaine 4%. Prep: Betadine Drops. Prior to injection, risks/benefits/alternatives discussed including but not limited to infection, loss of vision or eye, hemorrhage, cataract, glaucoma, retinal tears or detachment. The patient wished to proceed with treatment. Betadine prep was performed. Topical anesthesia was induced with Alcaine. Additional anesthesia was achieved using drop(s) or injection checked above. A drop of Povidone-iodine 5% ophthalmic solution was instilled over the injection site and in the inferior fornix. A single use prefilled syringe of intravitreal Eylea 2mg/0.05ml was used and excess was disposed of as waste. The needle was passed 3.0 mm posterior to the limbus in pseudophakic patients, and 3.5 mm posterior to the limbus in phakic patients. Injection time: 10:15AM.  Patient tolerated procedure well. There were no complications. The eye was irrigated with sterile irrigating solution. Post procedure instructions given. The patient was instructed to use Artificial Tears q.i.d. p.r.n for comfort. The patient was instructed to return for re-evaluation in approximately 4-12 weeks depending on his/her condition and was told to call immediately if vision decreases and/or if his/her eye becomes red, painful, and/or light sensitive. The patient was instructed to go to the emergency room or call 911 if unable to reach the doctor within an hour or two of trying or calling. Michael Velazco PROCEDURE NOTE: Lucentis 0.5mg PFS OS. Diagnosis: Neovascular AMD with Active CNV. Anesthesia: Lidocaine 4%. Prep: Betadine Flush. Prior to injection, risks/benefits/alternatives discussed including but not limited to infection, loss of vision or eye, hemorrhage, cataract, glaucoma, retinal tears or detachment. The patient wished to proceed with treatment. Topical anesthesia was induced with Alcaine. Additional anesthesia was achieved using drop(s) or injection checked above. A drop of Povidone-iodine 5% ophthalmic solution was instilled over the injection site and in the inferior fornix. Betadine prep was performed. A single use prefilled syringe of intravitreal Lucentis 0.5mg/0.05ml was used and excess was disposed of as waste. The needle was passed 3.0 mm posterior to the limbus in pseudophakic patients, and 3.5 mm posterior to the limbus in phakic patients. Injection Time 10:15AM. Patient tolerated the procedure well. There were no complications. The eye was irrigated with sterile irrigating solution. Post procedure instructions given. The patient was instructed to use Artificial Tears q.i.d. p.r.n for comfort. The patient was instructed to return for re-evaluation in approximately 4-12 weeks depending on his/her condition and was told to call immediately if vision decreases and/or if his/her eye becomes red, painful, and/or light sensitive. The patient was instructed to go to the emergency room or call 911 if unable to reach the doctor within an hour or two of trying or calling. Michael Velazco

## 2022-02-01 NOTE — PROCEDURE NOTE: CLINICAL
PROCEDURE NOTE: Eylea PFS OD. Diagnosis: Neovascular AMD with Active CNV. Anesthesia: Lidocaine 4%. Prep: Betadine Drops. Prior to injection, risks/benefits/alternatives discussed including but not limited to infection, loss of vision or eye, hemorrhage, cataract, glaucoma, retinal tears or detachment. The patient wished to proceed with treatment. Betadine prep was performed. Topical anesthesia was induced with Alcaine. Additional anesthesia was achieved using drop(s) or injection checked above. A drop of Povidone-iodine 5% ophthalmic solution was instilled over the injection site and in the inferior fornix. A single use prefilled syringe of intravitreal Eylea 2mg/0.05ml was used and excess was disposed of as waste. The needle was passed 3.0 mm posterior to the limbus in pseudophakic patients, and 3.5 mm posterior to the limbus in phakic patients. Injection time: 9:00 AM.  Patient tolerated procedure well. There were no complications. The eye was irrigated with sterile irrigating solution. Post procedure instructions given. The patient was instructed to use Artificial Tears q.i.d. p.r.n for comfort. The patient was instructed to return for re-evaluation in approximately 4-12 weeks depending on his/her condition and was told to call immediately if vision decreases and/or if his/her eye becomes red, painful, and/or light sensitive. The patient was instructed to go to the emergency room or call 911 if unable to reach the doctor within an hour or two of trying or calling. Suzy Modi PROCEDURE NOTE: Lucentis 0.5mg PFS OS. Diagnosis: Neovascular AMD with Active CNV. Anesthesia: Lidocaine 4%. Prep: Betadine Flush. Prior to injection, risks/benefits/alternatives discussed including but not limited to infection, loss of vision or eye, hemorrhage, cataract, glaucoma, retinal tears or detachment. The patient wished to proceed with treatment. Topical anesthesia was induced with Alcaine. Additional anesthesia was achieved using drop(s) or injection checked above. A drop of Povidone-iodine 5% ophthalmic solution was instilled over the injection site and in the inferior fornix. Betadine prep was performed. A single use prefilled syringe of intravitreal Lucentis 0.5mg/0.05ml was used and excess was disposed of as waste. The needle was passed 3.0 mm posterior to the limbus in pseudophakic patients, and 3.5 mm posterior to the limbus in phakic patients. Injection Time 9:00 AM. Patient tolerated the procedure well. There were no complications. The eye was irrigated with sterile irrigating solution. Post procedure instructions given. The patient was instructed to use Artificial Tears q.i.d. p.r.n for comfort. The patient was instructed to return for re-evaluation in approximately 4-12 weeks depending on his/her condition and was told to call immediately if vision decreases and/or if his/her eye becomes red, painful, and/or light sensitive. The patient was instructed to go to the emergency room or call 911 if unable to reach the doctor within an hour or two of trying or calling. Suzy Modi

## 2022-02-01 NOTE — PATIENT DISCUSSION
If no improvement after 2 weeks, Patient instructed to see DR Ara Hutchinson for further evaluation.

## 2022-02-16 NOTE — PATIENT DISCUSSION
"2 16 22 ""STEADY ACHE"" IF I TOUCH IT HURTS - PROB 2ND HUMA - USES REFRESH ""NOT VERY OFTEN"" - INCREASE TO 4-5 X A DAY.  HAS SENSITIVITY ON SUP TEMP PORTION OF HER EYE - IF IT DOES NOT IMPROVE IN 1 WK AFTER AT TO GIVE RX FOR  TRIAL OF LOTEMAX EYE DROPS - NO SIG OF CELLS OR INJECTION TO SUGGEST INFLAMMATION OTHER THAN 2ND HUMA. "

## 2022-02-16 NOTE — PATIENT DISCUSSION
If no improvement after 2 weeks, Patient instructed to see DR Eleazar Pittman for further evaluation.

## 2022-03-04 NOTE — PATIENT DISCUSSION
3 4 22 NO OCULAR ETIOLOGY - LID FLIPPED AND NO SIGN LID DISEASE - HAS BEEN PAINFUL EVERY SINCE SHE RECIEVED EYLEA TX 2 1 22 - NO IMPROVEMENT WITH LOTEMAX OR AT - RECOM [facility] W/ DR Jazzmine Bautista AND PT TO STOP LOTEMAX - HAS PERIPHERAL NEUROPATHY ? RELATED TO EYE PAIN.

## 2022-03-04 NOTE — PATIENT DISCUSSION
"2 16 22 ""STEADY ACHE"" IF I TOUCH IT HURTS - PROB 2ND HMUA - USES REFRESH ""NOT VERY OFTEN"" - INCREASE TO 4-5 X A DAY.  HAS SENSITIVITY ON SUP TEMP PORTION OF HER EYE - IF IT DOES NOT IMPROVE IN 1 WK AFTER AT TO GIVE RX FOR  TRIAL OF LOTEMAX EYE DROPS - NO SIG OF CELLS OR INJECTION TO SUGGEST INFLAMMATION OTHER THAN 2ND HUMA. "

## 2022-03-22 NOTE — PATIENT DISCUSSION
3 4 22 NO OCULAR ETIOLOGY - LID FLIPPED AND NO SIGN LID DISEASE - HAS BEEN PAINFUL EVERY SINCE SHE RECIEVED EYLEA TX 2 1 22 - NO IMPROVEMENT WITH LOTEMAX OR AT - RECOM [facility] W/ DR Mattie Hudson AND PT TO STOP LOTEMAX - HAS PERIPHERAL NEUROPATHY ? RELATED TO EYE PAIN.

## 2022-03-22 NOTE — PROCEDURE NOTE: CLINICAL
PROCEDURE NOTE: Eylea PFS OD. Diagnosis: Neovascular AMD with Active CNV. Anesthesia: Lidocaine 4%. Prep: Betadine Drops. Prior to injection, risks/benefits/alternatives discussed including but not limited to infection, loss of vision or eye, hemorrhage, cataract, glaucoma, retinal tears or detachment. The patient wished to proceed with treatment. Betadine prep was performed. Topical anesthesia was induced with Alcaine. Additional anesthesia was achieved using drop(s) or injection checked above. A drop of Povidone-iodine 5% ophthalmic solution was instilled over the injection site and in the inferior fornix. A single use prefilled syringe of intravitreal Eylea 2mg/0.05ml was used and excess was disposed of as waste. The needle was passed 3.0 mm posterior to the limbus in pseudophakic patients, and 3.5 mm posterior to the limbus in phakic patients. Injection time: 9:10 am.  Patient tolerated procedure well. There were no complications. The eye was irrigated with sterile irrigating solution. Post procedure instructions given. The patient was instructed to use Artificial Tears q.i.d. p.r.n for comfort. The patient was instructed to return for re-evaluation in approximately 4-12 weeks depending on his/her condition and was told to call immediately if vision decreases and/or if his/her eye becomes red, painful, and/or light sensitive. The patient was instructed to go to the emergency room or call 911 if unable to reach the doctor within an hour or two of trying or calling. Natali Olson PROCEDURE NOTE: Lucentis 0.5mg PFS OS. Diagnosis: Neovascular AMD with Active CNV. Anesthesia: Lidocaine. Prep: Betadine Flush. Prior to injection, risks/benefits/alternatives discussed including but not limited to infection, loss of vision or eye, hemorrhage, cataract, glaucoma, retinal tears or detachment. The patient wished to proceed with treatment. Topical anesthesia was induced with Alcaine. Additional anesthesia was achieved using drop(s) or injection checked above. A drop of Povidone-iodine 5% ophthalmic solution was instilled over the injection site and in the inferior fornix. Betadine prep was performed. A single use prefilled syringe of intravitreal Lucentis 0.5mg/0.05ml was used and excess was disposed of as waste. The needle was passed 3.0 mm posterior to the limbus in pseudophakic patients, and 3.5 mm posterior to the limbus in phakic patients. Injection Time 9;10 AM. Patient tolerated the procedure well. There were no complications. The eye was irrigated with sterile irrigating solution. Post procedure instructions given. The patient was instructed to use Artificial Tears q.i.d. p.r.n for comfort. The patient was instructed to return for re-evaluation in approximately 4-12 weeks depending on his/her condition and was told to call immediately if vision decreases and/or if his/her eye becomes red, painful, and/or light sensitive. The patient was instructed to go to the emergency room or call 911 if unable to reach the doctor within an hour or two of trying or calling. Natali Olson

## 2022-04-08 NOTE — PATIENT DISCUSSION
4/8/22 COMPLAINS OF PAIN ON EYE MOVEMENT, DID NOT GET BETTER WITH LOTEMAX. [email or web address] CT ORBIT, THEN FOLLOW DR Chow Diss.

## 2022-04-08 NOTE — PATIENT DISCUSSION
3 4 22 NO OCULAR ETIOLOGY - LID FLIPPED AND NO SIGN LID DISEASE - HAS BEEN PAINFUL EVERY SINCE SHE RECIEVED EYLEA TX 2 1 22 - NO IMPROVEMENT WITH LOTEMAX OR AT - RECOM [facility] W/ DR Rabia Wright AND PT TO STOP LOTEMAX - HAS PERIPHERAL NEUROPATHY ? RELATED TO EYE PAIN.

## 2022-04-08 NOTE — PATIENT DISCUSSION
If no improvement after 2 weeks, Patient instructed to see DR Meeta Gutierres for further evaluation.

## 2022-04-18 NOTE — PATIENT DISCUSSION
4/8/22 COMPLAINS OF PAIN ON EYE MOVEMENT, DID NOT GET BETTER WITH LOTEMAX. [employer] CT ORBIT, THEN FOLLOW DR Elvira Perry.

## 2022-04-18 NOTE — PATIENT DISCUSSION
If no improvement after 2 weeks, Patient instructed to see DR Georgie Zavala for further evaluation.

## 2022-04-18 NOTE — PATIENT DISCUSSION
3 4 22 NO OCULAR ETIOLOGY - LID FLIPPED AND NO SIGN LID DISEASE - HAS BEEN PAINFUL EVERY SINCE SHE RECIEVED EYLEA TX 2 1 22 - NO IMPROVEMENT WITH LOTEMAX OR AT - RECOM [facility] GARCIA/ DR Avalos Cancer AND PT TO STOP LOTEMAX - HAS PERIPHERAL NEUROPATHY ? RELATED TO EYE PAIN.

## 2022-05-09 NOTE — PATIENT DISCUSSION
4/8/22 COMPLAINS OF PAIN ON EYE MOVEMENT, DID NOT GET BETTER WITH LOTEMAX. [employer] CT ORBIT, THEN FOLLOW DR Anita Sarabia.

## 2022-05-09 NOTE — PATIENT DISCUSSION
3 4 22 NO OCULAR ETIOLOGY - LID FLIPPED AND NO SIGN LID DISEASE - HAS BEEN PAINFUL EVERY SINCE SHE RECIEVED EYLEA TX 2 1 22 - NO IMPROVEMENT WITH LOTEMAX OR AT - RECOM [facility] W/ DR Elva Mari AND PT TO STOP LOTEMAX - HAS PERIPHERAL NEUROPATHY ? RELATED TO EYE PAIN.

## 2022-05-09 NOTE — PATIENT DISCUSSION
5 19 20 LUCENTIS - PROM EDEMA AT 4 MONTHS  - INCREASED - PT COULD NOT COME IN 2ND BEING IN THE HOSPITAL  - RETX AND REEVAL IN 4-6 WKS. ACP

## 2022-05-09 NOTE — PROCEDURE NOTE: CLINICAL
PROCEDURE NOTE: Eylea PFS OD. Diagnosis: Neovascular AMD with Active CNV. Anesthesia: Lidocaine 4%. Prep: Betadine Drops. Prior to injection, risks/benefits/alternatives discussed including but not limited to infection, loss of vision or eye, hemorrhage, cataract, glaucoma, retinal tears or detachment. The patient wished to proceed with treatment. Betadine prep was performed. Topical anesthesia was induced with Alcaine. Additional anesthesia was achieved using drop(s) or injection checked above. A drop of Povidone-iodine 5% ophthalmic solution was instilled over the injection site and in the inferior fornix. A single use prefilled syringe of intravitreal Eylea 2mg/0.05ml was used and excess was disposed of as waste. The needle was passed 3.0 mm posterior to the limbus in pseudophakic patients, and 3.5 mm posterior to the limbus in phakic patients. Injection time: 9:45am.  Patient tolerated procedure well. There were no complications. The eye was irrigated with sterile irrigating solution. Post procedure instructions given. The patient was instructed to use Artificial Tears q.i.d. p.r.n for comfort. The patient was instructed to return for re-evaluation in approximately 4-12 weeks depending on his/her condition and was told to call immediately if vision decreases and/or if his/her eye becomes red, painful, and/or light sensitive. The patient was instructed to go to the emergency room or call 911 if unable to reach the doctor within an hour or two of trying or calling. Michael Velazco PROCEDURE NOTE: Lucentis 0.5mg PFS OS. Diagnosis: Neovascular AMD with Active CNV. Anesthesia: Lidocaine 4%. Prep: Betadine Flush. Prior to injection, risks/benefits/alternatives discussed including but not limited to infection, loss of vision or eye, hemorrhage, cataract, glaucoma, retinal tears or detachment. The patient wished to proceed with treatment. Topical anesthesia was induced with Alcaine. Additional anesthesia was achieved using drop(s) or injection checked above. A drop of Povidone-iodine 5% ophthalmic solution was instilled over the injection site and in the inferior fornix. Betadine prep was performed. A single use prefilled syringe of intravitreal Lucentis 0.5mg/0.05ml was used and excess was disposed of as waste. The needle was passed 3.0 mm posterior to the limbus in pseudophakic patients, and 3.5 mm posterior to the limbus in phakic patients. Injection Time 9:41AM. Patient tolerated the procedure well. There were no complications. The eye was irrigated with sterile irrigating solution. Post procedure instructions given. The patient was instructed to use Artificial Tears q.i.d. p.r.n for comfort. The patient was instructed to return for re-evaluation in approximately 4-12 weeks depending on his/her condition and was told to call immediately if vision decreases and/or if his/her eye becomes red, painful, and/or light sensitive. The patient was instructed to go to the emergency room or call 911 if unable to reach the doctor within an hour or two of trying or calling. Michael Velazco

## 2022-05-09 NOTE — PATIENT DISCUSSION
If no improvement after 2 weeks, Patient instructed to see  [facility] for further evaluation.

## 2022-05-11 NOTE — PATIENT DISCUSSION
Visually significant PCO present on exam today. Cannot improve vision with refraction/glasses at this time. Discussed treatment options including observation versus Yag capsulotomy. Laser recommended to improve vision. We discussed the risks/benefits of laser capsulotomy. All questions were answered. Patient elects to proceed. Schedule Yag capsulotomy OD.

## 2022-05-23 NOTE — PATIENT DISCUSSION
POST INJECTION EVALUATION, no signs of new infection, tear, RD, VF, EOM, CNS, Vascular or other problems or side effect from previous injection(s). Detail Level: Detailed

## 2022-05-26 NOTE — PATIENT DISCUSSION
Had temporary puncta plug inserted at [facility] 5/26/22 (per pt). Feels this helped relieve pain. would like PPP.

## 2022-05-26 NOTE — PROCEDURE NOTE: CLINICAL
PROCEDURE NOTE: Punctal Plugs, Silicone #1 Right Lower Lid. Prior to treatment, the risks/benefits/alternatives were discussed. The patient wished to proceed with procedure. One drop of proparacaine was placed and a drop of lidocaine gel was placed over the puncta. large permanent silicone plugs were inserted in RLL eyelids. Patient tolerated procedure well. There were no complications. Post procedure instructions given. Veraplug Large inserted. QWW#[id number].

## 2022-06-04 ENCOUNTER — TELEPHONE ENCOUNTER (OUTPATIENT)
Dept: URBAN - METROPOLITAN AREA CLINIC 68 | Facility: CLINIC | Age: 72
End: 2022-06-04

## 2022-06-04 RX ORDER — POLYETHYLENE GLYCOL 3350, SODIUM SULFATE, SODIUM CHLORIDE, POTASSIUM CHLORIDE, ASCORBIC ACID, SODIUM ASCORBATE 7.5-2.691G
KIT ORAL
Qty: 1 | Refills: 0 | OUTPATIENT
Start: 2012-07-10 | End: 2012-09-04

## 2022-06-05 ENCOUNTER — TELEPHONE ENCOUNTER (OUTPATIENT)
Dept: URBAN - METROPOLITAN AREA CLINIC 68 | Facility: CLINIC | Age: 72
End: 2022-06-05

## 2022-06-05 RX ORDER — SUMATRIPTAN SUCCINATE 25 MG/1
IMITREX( 25MG ORAL  AS NEEDED ) ACTIVE -HX ENTRY TABLET, FILM COATED ORAL AS NEEDED
Status: ACTIVE | COMMUNITY
Start: 2012-09-04

## 2022-06-05 RX ORDER — ESTRADIOL 10 UG/1
VAGIFEM( 10MCG VAGINAL  THREE TIMES WEEK ) ACTIVE -HX ENTRY INSERT VAGINAL
Status: ACTIVE | COMMUNITY
Start: 2012-09-04

## 2022-06-05 RX ORDER — MULTIVITAMIN
MULTIPLE VITAMIN(  ORAL  DAILY ) ACTIVE -HX ENTRY TABLET ORAL DAILY
Status: ACTIVE | COMMUNITY
Start: 2012-09-04

## 2022-06-09 NOTE — PATIENT DISCUSSION
PPP was displaced - reinserted extralarge PPP  - if this dislodges consider cauterizing puncta is symptomatic.

## 2022-06-09 NOTE — PROCEDURE NOTE: CLINICAL
PROCEDURE NOTE: Punctal Plugs, Silicone #1 Right Lower Lid. Prior to treatment, the risks/benefits/alternatives were discussed. The patient wished to proceed with procedure. extra large mm permanent silicone plugs were inserted in RLL eyelids. Patient tolerated procedure well. There were no complications. Post procedure instructions given . Xtra large veraplug [id number].

## 2022-06-23 NOTE — PATIENT DISCUSSION
EMERGENCY DEPARTMENT ENCOUNTER    CHIEF COMPLAINT  Chief Complaint   Patient presents with   • Altered Mental Status       HPI  Marcelo Reynoso is a 26 year old male with a past medical history including schizoaffective disorder and depression.  Mother called EMS telling them that he has been somewhat altered since Monday he is described being withdrawn anterior as of last night he stop speaking.  Patient has had similar episode in the past.  Patient will not answer questions.  He is not aggressive.  Mother states he smokes marijuana.  Vital signs prior to arrival were described as stable with slight hypertension.   Per EMS patient lives with his sister which is close in proximity to the mother's residence.       ALLERGIES  ALLERGIES:   Allergen Reactions   • Strawberries [Strawberry Flavor   (Food Or Med)] HIVES       CURRENT MEDICATIONS  No current facility-administered medications for this encounter.     Current Outpatient Medications   Medication Sig Dispense Refill   • DULoxetine (CYMBALTA) 60 MG capsule Take 1 capsule by mouth daily.     • traZODone (DESYREL) 100 MG tablet Take 1 tablet by mouth nightly.     • ARIPiprazole (ABILIFY) 10 MG tablet Take 1 tablet by mouth daily. 30 tablet 1   • doxycycline monohydrate (ADOXA) 100 MG tablet take 1 tablet by mouth twice daily for 14 days (take with a full glass of water) 28 tablet 0       PAST MEDICAL HISTORY  History reviewed. No pertinent past medical history.    SOCIAL HISTORY  Social History     Tobacco Use   • Smoking status: Current Every Day Smoker     Types: Cigarettes   • Smokeless tobacco: Never Used   Substance Use Topics   • Alcohol use: Yes   • Drug use: No       FAMILY HISTORY  History reviewed. No pertinent family history.    Nursing notes reviewed.    REVIEW OF SYSTEMS   Unable to obtain ROS from the patient as he is non-verbal.   See HPI.     PHYSICAL EXAM  ED Triage Vitals   BP    Pulse    Resp    Temp    SpO2      Visit Vitals  /88   Pulse 78  Recommend OBSERVATION and continued MONITORING for progression.   Temp 98.8 °F (37.1 °C) (Oral)   Resp 16   Ht 6' 3\" (1.905 m)   Wt 94 kg (207 lb 3.7 oz)   SpO2 98%   BMI 25.90 kg/m²         Constitutional:  Well developed, well nourished.  Follows simple commands.                 No acute  distress.   Appropriate attention to grooming.   Eyes:     PERRL, conjunctivae normal.                  No lid edema or erythema.                  No conjunctival pallor.  HENT: Scalp  atraumatic.                   Face atraumatic.                   External inspection of ears normal                  Inspection of nose normal.                  Oropharynx moist.                   Posterior  pharynx without erythema,exudate or edema.  Neck: Normal range of motion. No tenderness. Supple.   Respiratory:  No respiratory distress.   Normal breath sounds.                  No  rales.                 No wheezing.                  No use of accessory muscles.   Cardiovascular:  Normal rate, normal rhythm. No murmurs, gallops, or rubs.                   No pitting edema to lower extremities bilaterally.                 2+ radial pulses on the right .                   2+ radial pulse on the left.                  2+  dorsalis pedis pulse on the left.                  2+  dorsalis pedis pulse on the right.   GI:        Soft, nondistended. Normal bowel sounds.                No wincing or guarding with palpation.                 No  guarding. No rebound.   :      No suprapubic  tenderness.   No costovertebral tenderness.    Musculoskeletal:   Was able to ambulate to the ambulance with some shaking               Back:  No signs of trauma.               No joint erythema or swelling.  Integument:  Skin is warm and dry.  Open areas:  None   Lymphatic:  No cervical or supraclavicular lymphadenopathy noted.   Neurologic:  Alert.   No  facial asymmetry.               Sensory function:   Unable to test.                Eyes open. Follows commands.  Non-tender.      No agitation.     Labs  Results for orders  placed or performed during the hospital encounter of 06/23/22   Comprehensive Metabolic Panel   Result Value    Fasting Status     Sodium 138    Potassium 3.6    Chloride 102    Carbon Dioxide 22    Anion Gap 18    Glucose 96    BUN 20    Creatinine 1.00    Glomerular Filtration Rate >90     Comment: eGFR results = or >60 mL/min/1.73m2 = Normal kidney function. Estimated GFR calculated using the CKD-EPI-R (2021) equation that does not include race in the creatinine calculation.    BUN/ Creatinine Ratio 20    Calcium 9.7    Bilirubin, Total 0.9    GOT/AST 11    GPT/ALT 26    Alkaline Phosphatase 98    Albumin 4.8    Protein, Total 8.5 (H)    Globulin 3.7    A/G Ratio 1.3   Magnesium   Result Value    Magnesium 1.7   TROPONIN I, HIGH SENSITIVITY   Result Value    Troponin I, High Sensitivity 6   Alcohol   Result Value    Alcohol None Detected   Acetaminophen Level   Result Value    Acetaminophen <2 (L)   Salicylate Level   Result Value    Salicylate 4.4   CBC with Automated Differential (performable only)   Result Value    WBC 14.9 (H)    RBC 5.64    HGB 16.4    HCT 47.7    MCV 84.6    MCH 29.1    MCHC 34.4    RDW-CV 13.6    RDW-SD 42.2        NRBC 0    Neutrophil, Percent 82     Comment: Auto diff verified by manual slide review    Lymphocytes, Percent 10    Mono, Percent 8    Eosinophils, Percent 0    Basophils, Percent 0    Immature Granulocytes 0    Absolute Neutrophils 12.0 (H)    Absolute Lymphocytes 1.6    Absolute Monocytes 1.2 (H)    Absolute Eosinophils  0.0    Absolute Basophils 0.1    Absolute Immmature Granulocytes 0.1   Light Blue Top   Result Value    Extra Tube Hold for Add Ons   ISTAT8 VENOUS  POINT OF CARE   Result Value    BUN - POINT OF CARE 20    SODIUM - POINT OF CARE 141    POTASSIUM - POINT OF CARE 3.8    CHLORIDE - POINT OF CARE 105    TCO2 - POINT OF CARE 23    ANION GAP - POINT OF CARE 17    HEMATOCRIT - POINT OF CARE 49.0    HEMOGLOBIN - POINT OF CARE 16.7    GLUCOSE - POINT OF CARE  99    CALCIUM, IONIZED - POINT OF CARE 1.26    Creatinine 1.00    Glomerular Filtration Rate >90     Comment: eGFR results = or >60 mL/min/1.73m2 = Normal kidney function. Estimated GFR calculated using the CKD-EPI-R (2021) equation that does not include race in the creatinine calculation.       EKG Interpretation:    Per my review of the EKG tracing my findings are:  Normal sinus rhythm ventricular rate is 91.  P.r. interval is 152 QRS duration 86 QTC is 418.  When compared to EKG of 03/29/2021, no significant change.  No STEMI.        Radiology  CT HEAD WO CONTRAST   Final Result   IMPRESSION:  I do not see any acute change or bleed.         XR Chest AP or PA   Final Result   IMPRESSION:  Negative chest.        Per my direct visualization and review of the X-ray:  No pneumothorax or consolidation.   Final  interpretation pending formal review by Radiologist.            Rechecks  Medications   OLANZapine (ZyPREXA ZYDIS) disintegrating tablet 5 mg (5 mg Oral Given 6/23/22 2037)       ED Course Decision Making    Patient with change in mental status with schizoaffective disorder. Acutely psychotic. rule out common organic causes.  Patient's vital signs are remarkable for heart rate of 101 he is afebrile.  There are no signs of trauma.  [facility] made aware of the patient's presentation.       CT brain is negative.        Change to Observation Status and Indications:  The patient was placed in an ED observation status at 4:15 AM.    Due to the acuity and complexity of the patient's presentation further evaluation and reassessment will be required to determine the need for admission, transfer or safe discharge.    An extended stay is required for:   > repeat evaluations, lab review and discussion with behavioral health services.     [facility] attempted to evaluate the patient but he was non communicative.    7:30 PM:  CBC with leukocytosis of 14.9. PAtient has hx of leukocytosis. Ofever or neck stiffness.  No signs of meningitis.  negative acetaminophen.  Salicylate 4.4.  See CMP   total protein of 8.5 otherwise unremarkable.  Troponin is normal.  Awaiting urine.         8:51 PM patient took his Zyprexa. Zydis.  Is staring at the wall smiling.     8:52 PM: Spoke to mom.  Phone number is [phone].  Mother states the patient is normally communicative at baseline.  That he has had a downhill decline for a week or 2.   She confirms that he lives with his sister his sister is not thrilled with living with him recently. There has been no violent behavior just odd behavior that scares the family like standing in a dark room.   Mother states she gives him his medications daily.  She is unsure if he is using street drugs other than Marijuana.     10:47 PM The paatient is sleeping.  Patient endorsed to Dr. Jani Swanson at 2300.  Previous ER visits does show that the patient at times does wakes up on his own and communicates.  Behavioral health aware of the observation status.                Reva Hernandez MD  06/23/22 0020    0250  Patient re-evaluated at this time.  Patient states he is hungry and wants to go home.  Patient provided with food.   states they will reevaluate the patient in an hour.  0415  Patient pacing in the hallway requesting to be discharged.  Patient states he just lives of the street and wants to go home.  Patient encouraged to stay but is insistent.  Patient will be discharged with instructions to follow up in the outpatient setting and return as needed for worsening symptoms.       Guero Swanson,   06/24/22 0251       Guero Swanson,   06/24/22 0257       Guero Swanson,   06/24/22 0416

## 2022-06-25 ENCOUNTER — TELEPHONE ENCOUNTER (OUTPATIENT)
Age: 72
End: 2022-06-25

## 2022-06-25 RX ORDER — POLYETHYLENE GLYCOL 3350, SODIUM SULFATE, SODIUM CHLORIDE, POTASSIUM CHLORIDE, ASCORBIC ACID, SODIUM ASCORBATE 7.5-2.691G
KIT ORAL
Qty: 1 | Refills: 0 | OUTPATIENT
Start: 2012-07-10 | End: 2012-09-04

## 2022-06-26 ENCOUNTER — TELEPHONE ENCOUNTER (OUTPATIENT)
Age: 72
End: 2022-06-26

## 2022-06-26 RX ORDER — PRENATAL 168/IRON/FOLIC/OMEGA3 27-800-235
CITRACAL + D( 250-200MG-UNIT ORAL  TWO TABS DAILY ) ACTIVE -HX ENTRY CAPSULE ORAL
Status: ACTIVE | COMMUNITY
Start: 2012-09-04

## 2022-06-26 RX ORDER — ESTRADIOL 10 UG/1
VAGIFEM( 10MCG VAGINAL  THREE TIMES WEEK ) ACTIVE -HX ENTRY INSERT VAGINAL
Status: ACTIVE | COMMUNITY
Start: 2012-09-04

## 2022-06-26 RX ORDER — SUMATRIPTAN SUCCINATE 25 MG/1
IMITREX( 25MG ORAL  AS NEEDED ) ACTIVE -HX ENTRY TABLET, FILM COATED ORAL AS NEEDED
Status: ACTIVE | COMMUNITY
Start: 2012-09-04

## 2022-06-26 RX ORDER — ASPIRIN 81 MG/1
ASPIRIN( 500MG ORAL  FOUR TIMES DAILY ) ACTIVE -HX ENTRY TABLET, COATED ORAL
Status: ACTIVE | COMMUNITY
Start: 2012-09-04

## 2022-06-30 NOTE — PATIENT DISCUSSION
MOST LIKELY AMD/ERM ARE CONTRIBUTING TO HER DECREASED VA, ALTHOUGH ERM IS NOT SIG ENOUGHT TO [facility].

## 2022-06-30 NOTE — PATIENT DISCUSSION
3 4 22 NO OCULAR ETIOLOGY - LID FLIPPED AND NO SIGN LID DISEASE - HAS BEEN PAINFUL EVERY SINCE SHE RECIEVED EYLEA TX 2 1 22 - NO IMPROVEMENT WITH LOTEMAX OR AT - RECOM [facility] W/ DR Alistair Echeverria AND PT TO STOP LOTEMAX - HAS PERIPHERAL NEUROPATHY ? RELATED TO EYE PAIN.

## 2022-06-30 NOTE — PROCEDURE NOTE: CLINICAL
PROCEDURE NOTE: Eylea PFS OD. Diagnosis: Neovascular AMD with Active CNV. Anesthesia: Lidocaine 4%. Prep: Betadine Drops. Prior to injection, risks/benefits/alternatives discussed including but not limited to infection, loss of vision or eye, hemorrhage, cataract, glaucoma, retinal tears or detachment. The patient wished to proceed with treatment. Betadine prep was performed. Topical anesthesia was induced with Alcaine. Additional anesthesia was achieved using drop(s) or injection checked above. A drop of Povidone-iodine 5% ophthalmic solution was instilled over the injection site and in the inferior fornix. A single use prefilled syringe of intravitreal Eylea 2mg/0.05ml was used and excess was disposed of as waste. The needle was passed 3.0 mm posterior to the limbus in pseudophakic patients, and 3.5 mm posterior to the limbus in phakic patients. Injection time: 1137AM.  Patient tolerated procedure well. There were no complications. The eye was irrigated with sterile irrigating solution. Post procedure instructions given. The patient was instructed to use Artificial Tears q.i.d. p.r.n for comfort. The patient was instructed to return for re-evaluation in approximately 4-12 weeks depending on his/her condition and was told to call immediately if vision decreases and/or if his/her eye becomes red, painful, and/or light sensitive. The patient was instructed to go to the emergency room or call 911 if unable to reach the doctor within an hour or two of trying or calling. Pedro Anthony PROCEDURE NOTE: Lucentis 0.5mg PFS OS. Diagnosis: Neovascular AMD with Active CNV. Anesthesia: Lidocaine 4%. Prep: Betadine Flush. Prior to injection, risks/benefits/alternatives discussed including but not limited to infection, loss of vision or eye, hemorrhage, cataract, glaucoma, retinal tears or detachment. The patient wished to proceed with treatment. Topical anesthesia was induced with Alcaine. Additional anesthesia was achieved using drop(s) or injection checked above. A drop of Povidone-iodine 5% ophthalmic solution was instilled over the injection site and in the inferior fornix. Betadine prep was performed. A single use prefilled syringe of intravitreal Lucentis 0.5mg/0.05ml was used and excess was disposed of as waste. The needle was passed 3.0 mm posterior to the limbus in pseudophakic patients, and 3.5 mm posterior to the limbus in phakic patients. Injection Time 1136AM. Patient tolerated the procedure well. There were no complications. The eye was irrigated with sterile irrigating solution. Post procedure instructions given. The patient was instructed to use Artificial Tears q.i.d. p.r.n for comfort. The patient was instructed to return for re-evaluation in approximately 4-12 weeks depending on his/her condition and was told to call immediately if vision decreases and/or if his/her eye becomes red, painful, and/or light sensitive. The patient was instructed to go to the emergency room or call 911 if unable to reach the doctor within an hour or two of trying or calling. Pedro Cruz

## 2022-06-30 NOTE — PATIENT DISCUSSION
4/8/22 COMPLAINS OF PAIN ON EYE MOVEMENT, DID NOT GET BETTER WITH LOTEMAX. [employer] CT ORBIT, THEN FOLLOW DR Ely Beasley.

## 2022-08-26 NOTE — PATIENT DISCUSSION
BMI Within normal limits, continue current management. Subjective:       Patient ID: Giulia Galan is a 85 y.o. female.    Chief Complaint: Poison Ivy    HPI Comments: 86 yo female presents to Urgent Care with reports of contact with poison ivy 3 days ago in outside garden. She reports itching and rash spreading to legs.     Poison Ivy   This is a new problem. The current episode started in the past 7 days. The problem has been waxing and waning since onset. The affected locations include the abdomen, left arm and right arm. The rash is characterized by itchiness and redness. She was exposed to plant contact. Pertinent negatives include no anorexia, congestion, cough, diarrhea, eye pain, facial edema, fatigue, fever, joint pain, nail changes, rhinorrhea, shortness of breath, sore throat or vomiting. Past treatments include moisturizer. The treatment provided mild relief. There is no history of allergies, asthma, eczema or varicella.     Review of Systems   Constitutional: Negative for fatigue and fever.   HENT: Negative for congestion, rhinorrhea and sore throat.    Eyes: Negative for pain.   Respiratory: Negative for cough and shortness of breath.    Gastrointestinal: Negative for anorexia, diarrhea and vomiting.   Musculoskeletal: Negative for joint pain.   Skin: Positive for rash. Negative for nail changes.       Objective:      Physical Exam   Constitutional: She is oriented to person, place, and time. She appears well-developed and well-nourished.   Cardiovascular: Normal rate and normal heart sounds.    Pulmonary/Chest: Effort normal and breath sounds normal.   Neurological: She is alert and oriented to person, place, and time.   Skin: Skin is warm. There is erythema.        Psychiatric: She has a normal mood and affect.   Nursing note and vitals reviewed.      Assessment:       1. Poison ivy dermatitis    2. Pruritus        Plan:         Giulia was seen today for poison ivy.    Diagnoses and all orders for this visit:    Poison ivy dermatitis  -     betamethasone  acetate-betamethasone sodium phosphate injection 12 mg; Inject 2 mLs (12 mg total) into the muscle once.    Pruritus  -     hydrOXYzine HCl (ATARAX) 10 MG Tab; Take 1 tablet (10 mg total) by mouth 3 (three) times daily as needed.    Patient informed of potential side effects of steroid injection including elevating blood pressure, increased blood glucose levels, increased risk of opportunistic infections, peptic ulcer disease and GI bleeding, insomnia, tremors, suppression of ones own steroid production, avascular necrosis, osteoporosis, increased intraocular pressure, etc. Patient verbalizes risk/benefit profile and agrees to steroid injection.     How your skin may react  A mild rash may become red, swollen, and itchy. The rash may form a line on your skin where you brushed against the plant. If you have a severe rash, your itching may worsen. And your rash may blister and ooze. If this happens, seek medical care. The fluid from your blisters will not make your rash spread. With or without medical care, your rash may last up to 3 weeks. In the future, try to avoid coming in contact with these plants.  When to call your health care provider or report to ER  Call your health care provider if:  · Your rash is severe  · The rash spreads beyond the exposed part of your body or affects your face.  · The rash does not clear up within a few weeks  You may be given medicine to take by mouth or apply directly on the skin. Go to the emergency room if you have any trouble breathing or swallowing or have any significant swelling.

## 2022-08-26 NOTE — PROCEDURE NOTE: CLINICAL
PROCEDURE NOTE: Eylea PFS OD. Diagnosis: Neovascular AMD with Active CNV. Anesthesia: Lidocaine 4%. Prep: Betadine Drops. Prior to injection, risks/benefits/alternatives discussed including but not limited to infection, loss of vision or eye, hemorrhage, cataract, glaucoma, retinal tears or detachment. The patient wished to proceed with treatment. Betadine prep was performed. Topical anesthesia was induced with Alcaine. Additional anesthesia was achieved using drop(s) or injection checked above. A drop of Povidone-iodine 5% ophthalmic solution was instilled over the injection site and in the inferior fornix. A single use prefilled syringe of intravitreal Eylea 2mg/0.05ml was used and excess was disposed of as waste. The needle was passed 3.0 mm posterior to the limbus in pseudophakic patients, and 3.5 mm posterior to the limbus in phakic patients. Injection time: *. Patient tolerated procedure well. There were no complications. The eye was irrigated with sterile irrigating solution. Post procedure instructions given. The patient was instructed to use Artificial Tears q.i.d. p.r.n for comfort. The patient was instructed to return for re-evaluation in approximately 4-12 weeks depending on his/her condition and was told to call immediately if vision decreases and/or if his/her eye becomes red, painful, and/or light sensitive. The patient was instructed to go to the emergency room or call 911 if unable to reach the doctor within an hour or two of trying or calling. Michael Velazco PROCEDURE NOTE: Lucentis 0.5mg PFS OS. Diagnosis: Neovascular AMD with Active CNV. Anesthesia: Topical. Prep: Betadine Flush. Prior to injection, risks/benefits/alternatives discussed including but not limited to infection, loss of vision or eye, hemorrhage, cataract, glaucoma, retinal tears or detachment. The patient wished to proceed with treatment. Topical anesthesia was induced with Alcaine. Additional anesthesia was achieved using drop(s) or injection checked above. A drop of Povidone-iodine 5% ophthalmic solution was instilled over the injection site and in the inferior fornix. Betadine prep was performed. A single use prefilled syringe of intravitreal Lucentis 0.5mg/0.05ml was used and excess was disposed of as waste. The needle was passed 3.0 mm posterior to the limbus in pseudophakic patients, and 3.5 mm posterior to the limbus in phakic patients. Injection Time *. Patient tolerated the procedure well. There were no complications. The eye was irrigated with sterile irrigating solution. Post procedure instructions given. The patient was instructed to use Artificial Tears q.i.d. p.r.n for comfort. The patient was instructed to return for re-evaluation in approximately 4-12 weeks depending on his/her condition and was told to call immediately if vision decreases and/or if his/her eye becomes red, painful, and/or light sensitive. The patient was instructed to go to the emergency room or call 911 if unable to reach the doctor within an hour or two of trying or calling. Michael Velazco

## 2022-08-26 NOTE — PATIENT DISCUSSION
3 4 22 NO OCULAR ETIOLOGY - LID FLIPPED AND NO SIGN LID DISEASE - HAS BEEN PAINFUL EVERY SINCE SHE RECIEVED EYLEA TX 2 1 22 - NO IMPROVEMENT WITH LOTEMAX OR AT - RECOM [facility] GARCIA/ DR Howard Meals AND PT TO STOP LOTEMAX - HAS PERIPHERAL NEUROPATHY ? RELATED TO EYE PAIN.

## 2022-08-26 NOTE — PROCEDURE NOTE: CLINICAL
PROCEDURE NOTE: Eylea PFS OD. Diagnosis: Neovascular AMD with Active CNV. Anesthesia: Lidocaine 4%. Prep: Betadine Drops. Prior to injection, risks/benefits/alternatives discussed including but not limited to infection, loss of vision or eye, hemorrhage, cataract, glaucoma, retinal tears or detachment. The patient wished to proceed with treatment. Betadine prep was performed. Topical anesthesia was induced with Alcaine. Additional anesthesia was achieved using drop(s) or injection checked above. A drop of Povidone-iodine 5% ophthalmic solution was instilled over the injection site and in the inferior fornix. A single use prefilled syringe of intravitreal Eylea 2mg/0.05ml was used and excess was disposed of as waste. The needle was passed 3.0 mm posterior to the limbus in pseudophakic patients, and 3.5 mm posterior to the limbus in phakic patients. Injection time: *. Patient tolerated procedure well. There were no complications. The eye was irrigated with sterile irrigating solution. Post procedure instructions given. The patient was instructed to use Artificial Tears q.i.d. p.r.n for comfort. The patient was instructed to return for re-evaluation in approximately 4-12 weeks depending on his/her condition and was told to call immediately if vision decreases and/or if his/her eye becomes red, painful, and/or light sensitive. The patient was instructed to go to the emergency room or call 911 if unable to reach the doctor within an hour or two of trying or calling. Michael Velazco PROCEDURE NOTE: Lucentis 0.5mg PFS OS. Diagnosis: Neovascular AMD with Active CNV. Anesthesia: Topical. Prep: Betadine Flush. Prior to injection, risks/benefits/alternatives discussed including but not limited to infection, loss of vision or eye, hemorrhage, cataract, glaucoma, retinal tears or detachment. The patient wished to proceed with treatment. Topical anesthesia was induced with Alcaine. Additional anesthesia was achieved using drop(s) or injection checked above. A drop of Povidone-iodine 5% ophthalmic solution was instilled over the injection site and in the inferior fornix. Betadine prep was performed. A single use prefilled syringe of intravitreal Lucentis 0.5mg/0.05ml was used and excess was disposed of as waste. The needle was passed 3.0 mm posterior to the limbus in pseudophakic patients, and 3.5 mm posterior to the limbus in phakic patients. Injection Time *. Patient tolerated the procedure well. There were no complications. The eye was irrigated with sterile irrigating solution. Post procedure instructions given. The patient was instructed to use Artificial Tears q.i.d. p.r.n for comfort. The patient was instructed to return for re-evaluation in approximately 4-12 weeks depending on his/her condition and was told to call immediately if vision decreases and/or if his/her eye becomes red, painful, and/or light sensitive. The patient was instructed to go to the emergency room or call 911 if unable to reach the doctor within an hour or two of trying or calling. Michale Velazco

## 2022-08-26 NOTE — PATIENT DISCUSSION
6/30/22 POST PUNCTAL PLUG OD IMPROVED. Detail Level: Generalized Detail Level: Simple Detail Level: Zone

## 2022-08-26 NOTE — PATIENT DISCUSSION
If no improvement after 2 weeks, Patient instructed to see DR Miguel Campbell for further evaluation.

## 2022-08-26 NOTE — PATIENT DISCUSSION
4/8/22 COMPLAINS OF PAIN ON EYE MOVEMENT, DID NOT GET BETTER WITH LOTEMAX. [employer] CT ORBIT, THEN FOLLOW DR Esperanza Oro.

## 2022-09-02 ENCOUNTER — COMPREHENSIVE EXAM (OUTPATIENT)
Dept: URBAN - METROPOLITAN AREA CLINIC 32 | Facility: CLINIC | Age: 72
End: 2022-09-02

## 2022-09-02 DIAGNOSIS — H25.13: ICD-10-CM

## 2022-09-02 PROCEDURE — 92015 DETERMINE REFRACTIVE STATE: CPT

## 2022-09-02 PROCEDURE — 99214 OFFICE O/P EST MOD 30 MIN: CPT

## 2022-09-02 ASSESSMENT — KERATOMETRY
OS_AXISANGLE_DEGREES: 80
OS_K2POWER_DIOPTERS: 44
OS_K2POWER_DIOPTERS: 44.00
OD_AXISANGLE_DEGREES: 7
OS_AXISANGLE2_DEGREES: 170
OD_K1POWER_DIOPTERS: 44.50
OD_K2POWER_DIOPTERS: 44.5
OS_K1POWER_DIOPTERS: 44.50
OS_AXISANGLE2_DEGREES: 104
OD_AXISANGLE_DEGREES: 95
OD_AXISANGLE2_DEGREES: 5
OD_AXISANGLE2_DEGREES: 97
OS_AXISANGLE_DEGREES: 14
OS_K1POWER_DIOPTERS: 43.75
OD_K2POWER_DIOPTERS: 44.00
OD_K1POWER_DIOPTERS: 44

## 2022-09-02 ASSESSMENT — VISUAL ACUITY
OD_CC: 20/40-2
OD_CC: J3
OD_SC: 20/200
OS_SC: 20/200
OS_CC: 20/25-2
OD_SC: 20/400
OD_GLARE: 20/300
OD_PH: 20/30-2
OS_GLARE: 20/200
OS_SC: 20/400
OS_CC: J1-2

## 2022-09-02 ASSESSMENT — TONOMETRY
OD_IOP_MMHG: 18
OS_IOP_MMHG: 18

## 2022-10-14 NOTE — PROCEDURE NOTE: CLINICAL
PROCEDURE NOTE: Eylea PFS OD. Diagnosis: Neovascular AMD with Active CNV. Anesthesia: Lidocaine 4%. Prep: Betadine Drops. Prior to injection, risks/benefits/alternatives discussed including but not limited to infection, loss of vision or eye, hemorrhage, cataract, glaucoma, retinal tears or detachment. The patient wished to proceed with treatment. Betadine prep was performed. Topical anesthesia was induced with Alcaine. Additional anesthesia was achieved using drop(s) or injection checked above. A drop of Povidone-iodine 5% ophthalmic solution was instilled over the injection site and in the inferior fornix. A single use prefilled syringe of intravitreal Eylea 2mg/0.05ml was used and excess was disposed of as waste. The needle was passed 3.0 mm posterior to the limbus in pseudophakic patients, and 3.5 mm posterior to the limbus in phakic patients. Injection time: 9:40AM.  Patient tolerated procedure well. There were no complications. The eye was irrigated with sterile irrigating solution. Post procedure instructions given. The patient was instructed to use Artificial Tears q.i.d. p.r.n for comfort. The patient was instructed to return for re-evaluation in approximately 4-12 weeks depending on his/her condition and was told to call immediately if vision decreases and/or if his/her eye becomes red, painful, and/or light sensitive. The patient was instructed to go to the emergency room or call 911 if unable to reach the doctor within an hour or two of trying or calling. Rony Edwards PROCEDURE NOTE: Lucentis 0.5mg PFS OS. Diagnosis: Neovascular AMD with Active CNV. Anesthesia: Lidocaine 4%. Prep: Betadine Flush. Prior to injection, risks/benefits/alternatives discussed including but not limited to infection, loss of vision or eye, hemorrhage, cataract, glaucoma, retinal tears or detachment. The patient wished to proceed with treatment. Topical anesthesia was induced with Alcaine. Additional anesthesia was achieved using drop(s) or injection checked above. A drop of Povidone-iodine 5% ophthalmic solution was instilled over the injection site and in the inferior fornix. Betadine prep was performed. A single use prefilled syringe of intravitreal Lucentis 0.5mg/0.05ml was used and excess was disposed of as waste. The needle was passed 3.0 mm posterior to the limbus in pseudophakic patients, and 3.5 mm posterior to the limbus in phakic patients. Injection Time 9:40AM. Patient tolerated the procedure well. There were no complications. The eye was irrigated with sterile irrigating solution. Post procedure instructions given. The patient was instructed to use Artificial Tears q.i.d. p.r.n for comfort. The patient was instructed to return for re-evaluation in approximately 4-12 weeks depending on his/her condition and was told to call immediately if vision decreases and/or if his/her eye becomes red, painful, and/or light sensitive. The patient was instructed to go to the emergency room or call 911 if unable to reach the doctor within an hour or two of trying or calling. Rony Edwards

## 2022-10-14 NOTE — PATIENT DISCUSSION
If no improvement after 2 weeks, Patient instructed to see DR Luzmaria Barcenas for further evaluation.

## 2022-10-14 NOTE — PATIENT DISCUSSION
4/8/22 COMPLAINS OF PAIN ON EYE MOVEMENT, DID NOT GET BETTER WITH LOTEMAX. [facility] [employer] CT ORBIT, THEN FOLLOW DR Mattie Hudson.

## 2022-10-14 NOTE — PATIENT DISCUSSION
3 4 22 NO OCULAR ETIOLOGY - LID FLIPPED AND NO SIGN LID DISEASE - HAS BEEN PAINFUL EVERY SINCE SHE RECIEVED EYLEA TX 2 1 22 - NO IMPROVEMENT WITH LOTEMAX OR AT - RECOM [facility] W/ DR Chow Diss AND PT TO STOP LOTEMAX - HAS PERIPHERAL NEUROPATHY ? RELATED TO EYE PAIN.

## 2022-12-02 NOTE — PATIENT DISCUSSION
If no improvement after 2 weeks, Patient instructed to see  [facility] for further evaluation.

## 2022-12-02 NOTE — PROCEDURE NOTE: CLINICAL
PROCEDURE NOTE: Eylea PFS OD. Diagnosis: Neovascular AMD with Active CNV. Anesthesia: Lidocaine 4%. Prep: Betadine Drops. Prior to injection, risks/benefits/alternatives discussed including but not limited to infection, loss of vision or eye, hemorrhage, cataract, glaucoma, retinal tears or detachment. The patient wished to proceed with treatment. Betadine prep was performed. Topical anesthesia was induced with Alcaine. Additional anesthesia was achieved using drop(s) or injection checked above. A drop of Povidone-iodine 5% ophthalmic solution was instilled over the injection site and in the inferior fornix. A single use prefilled syringe of intravitreal Eylea 2mg/0.05ml was used and excess was disposed of as waste. The needle was passed 3.0 mm posterior to the limbus in pseudophakic patients, and 3.5 mm posterior to the limbus in phakic patients. Injection time: *. Patient tolerated procedure well. There were no complications. The eye was irrigated with sterile irrigating solution. Post procedure instructions given. The patient was instructed to use Artificial Tears q.i.d. p.r.n for comfort. The patient was instructed to return for re-evaluation in approximately 4-12 weeks depending on his/her condition and was told to call immediately if vision decreases and/or if his/her eye becomes red, painful, and/or light sensitive. The patient was instructed to go to the emergency room or call 911 if unable to reach the doctor within an hour or two of trying or calling. Gabriela Álvarez PROCEDURE NOTE: Lucentis 0.5mg PFS OS. Diagnosis: Neovascular AMD with Active CNV. Anesthesia: Lidocaine 4%. Prep: Betadine Flush. Prior to injection, risks/benefits/alternatives discussed including but not limited to infection, loss of vision or eye, hemorrhage, cataract, glaucoma, retinal tears or detachment. The patient wished to proceed with treatment. Topical anesthesia was induced with Alcaine. Additional anesthesia was achieved using drop(s) or injection checked above. A drop of Povidone-iodine 5% ophthalmic solution was instilled over the injection site and in the inferior fornix. Betadine prep was performed. A single use prefilled syringe of intravitreal Lucentis 0.5mg/0.05ml was used and excess was disposed of as waste. The needle was passed 3.0 mm posterior to the limbus in pseudophakic patients, and 3.5 mm posterior to the limbus in phakic patients. Injection Time *. Patient tolerated the procedure well. There were no complications. The eye was irrigated with sterile irrigating solution. Post procedure instructions given. The patient was instructed to use Artificial Tears q.i.d. p.r.n for comfort. The patient was instructed to return for re-evaluation in approximately 4-12 weeks depending on his/her condition and was told to call immediately if vision decreases and/or if his/her eye becomes red, painful, and/or light sensitive. The patient was instructed to go to the emergency room or call 911 if unable to reach the doctor within an hour or two of trying or calling. Gabriela Álvarez

## 2022-12-02 NOTE — PATIENT DISCUSSION
4/8/22 COMPLAINS OF PAIN ON EYE MOVEMENT, DID NOT GET BETTER WITH LOTEMAX. [facility] CT ORBIT, THEN FOLLOW DR Toma Taylor.

## 2022-12-14 NOTE — PATIENT DISCUSSION
Patient made aware of 24/7 emergency services. Render In Strict Bullet Format?: No Detail Level: Zone Initiate Treatment: Clobetasol 0.05% ointment BID mon-fri

## 2023-01-24 NOTE — PROCEDURE NOTE: CLINICAL
PROCEDURE NOTE: Eylea PFS OD. Diagnosis: Neovascular AMD with Active CNV. Anesthesia: Lidocaine 4%. Prep: Betadine Drops. Prior to injection, risks/benefits/alternatives discussed including but not limited to infection, loss of vision or eye, hemorrhage, cataract, glaucoma, retinal tears or detachment. The patient wished to proceed with treatment. Betadine prep was performed. Topical anesthesia was induced with Alcaine. Additional anesthesia was achieved using drop(s) or injection checked above. A drop of Povidone-iodine 5% ophthalmic solution was instilled over the injection site and in the inferior fornix. A single use prefilled syringe of intravitreal Eylea 2mg/0.05ml was used and excess was disposed of as waste. The needle was passed 3.0 mm posterior to the limbus in pseudophakic patients, and 3.5 mm posterior to the limbus in phakic patients. Injection time: 10:40AM.  Patient tolerated procedure well. There were no complications. The eye was irrigated with sterile irrigating solution. Post procedure instructions given. The patient was instructed to use Artificial Tears q.i.d. p.r.n for comfort. The patient was instructed to return for re-evaluation in approximately 4-12 weeks depending on his/her condition and was told to call immediately if vision decreases and/or if his/her eye becomes red, painful, and/or light sensitive. The patient was instructed to go to the emergency room or call 911 if unable to reach the doctor within an hour or two of trying or calling. Flower Elfego PROCEDURE NOTE: Lucentis 0.5mg PFS OS. Diagnosis: Neovascular AMD with Active CNV. Anesthesia: Lidocaine. Prep: Betadine Flush. Prior to injection, risks/benefits/alternatives discussed including but not limited to infection, loss of vision or eye, hemorrhage, cataract, glaucoma, retinal tears or detachment. The patient wished to proceed with treatment. Topical anesthesia was induced with Alcaine. Additional anesthesia was achieved using drop(s) or injection checked above. A drop of Povidone-iodine 5% ophthalmic solution was instilled over the injection site and in the inferior fornix. Betadine prep was performed. A single use prefilled syringe of intravitreal Lucentis 0.5mg/0.05ml was used and excess was disposed of as waste. The needle was passed 3.0 mm posterior to the limbus in pseudophakic patients, and 3.5 mm posterior to the limbus in phakic patients. Injection Time 10:40AM. Patient tolerated the procedure well. There were no complications. The eye was irrigated with sterile irrigating solution. Post procedure instructions given. The patient was instructed to use Artificial Tears q.i.d. p.r.n for comfort. The patient was instructed to return for re-evaluation in approximately 4-12 weeks depending on his/her condition and was told to call immediately if vision decreases and/or if his/her eye becomes red, painful, and/or light sensitive. The patient was instructed to go to the emergency room or call 911 if unable to reach the doctor within an hour or two of trying or calling. Flower Telles

## 2023-01-24 NOTE — PATIENT DISCUSSION
EYLEA AND EXTEND TO 7 AND REPEAT X 2  - TRACE EDEMA AT 6 WK - TX AND EXTEND. Per EMR dose increased at appt. 1/27/21 from TID to QID -Gabapentin Neurontin 600 mg Sig - Route: Take 2 tablets by mouth 4 times daily. - Oral    Routing to Dr. Bautista for advisement.

## 2023-01-24 NOTE — PATIENT DISCUSSION
4/8/22 COMPLAINS OF PAIN ON EYE MOVEMENT, DID NOT GET BETTER WITH LOTEMAX. [facility] CT ORBIT, THEN FOLLOW DR Dylon Franco.

## 2023-01-24 NOTE — PATIENT DISCUSSION
3 4 22 NO OCULAR ETIOLOGY - LID FLIPPED AND NO SIGN LID DISEASE - HAS BEEN PAINFUL EVERY SINCE SHE RECIEVED EYLEA TX 2 1 22 - NO IMPROVEMENT WITH LOTEMAX OR AT - RECOM [facility] W/ DR Larios Cough AND PT TO STOP LOTEMAX - HAS PERIPHERAL NEUROPATHY ? RELATED TO EYE PAIN.

## 2023-03-27 NOTE — PATIENT DISCUSSION
" EMERGENCY DEPARTMENT ENCOUNTER    Room Number:  37/37  Date seen:  3/27/2023  PCP: Oren Rust Jr., MD  Historian: Patient, son at bedside      HPI:  Chief Complaint: Balance issues  A complete HPI/ROS/PMH/PSH/SH/FH are unobtainable due to:   Context: Arlin Hutson is a 85 y.o. female who presents to the ED c/o balance issues.  Patient states she has had worsening of her balance since Thursday of last week.  Patient does have a history of ataxia and has been seen by neurology for same.  She was seen at urgent care and told that she might have vertigo.  She spoke with her primary care provider Dr. Rust who told her to come to the ER for further evaluation.  Patient denies diplopia or slurred speech.      MEDICAL RECORD REVIEW (non ED)  I reviewed prior medical records note patient was seen at urgent care 2 days ago and diagnosed with vertigo.  I did review office note from October of last year in which patient was seen for \"balance trouble\".  MRI of the brain around that time showed atrophy and small vessel changes.  Gait ataxia was felt to be a combination of inner ear malfunctioning and spinocerebellar atrophy.  Dr. Blancas did not feel that further investigation of peripheral neuropathy, myelopathy or NPH was indicated at that time.    PAST MEDICAL HISTORY  Active Ambulatory Problems     Diagnosis Date Noted   • Gastroesophageal reflux disease 02/13/2018   • Osteopenia of multiple sites 02/13/2018   • Essential hypertension 02/13/2018   • Colitis 02/13/2018   • Primary osteoarthritis of right knee 05/04/2018   • Primary osteoarthritis of left knee 05/30/2018   • Pneumonia of left lower lobe due to infectious organism 06/02/2018   • History of total right knee replacement 06/02/2018   • Abnormal CT of the abdomen 10/24/2018   • Rheumatoid arthritis with rheumatoid factor of multiple sites without organ or systems involvement ([facility]) 02/17/2020   • MGUS (monoclonal gammopathy of unknown significance) " CT 4 14 22 NEGATIVE. 08/12/2020   • Normocytic anemia 08/26/2020   • Arterial occlusion 03/22/2021   • Iron deficiency anemia 03/25/2022   • Adverse effect of iron 03/28/2022   • Paronychia 10/27/2022   • Paresthesia 10/27/2022   • Pain in right foot 10/27/2022   • Pain in limb 10/27/2022   • Onychomycosis due to dermatophyte 10/27/2022   • Onychocryptosis 10/27/2022   • Metatarsalgia of left foot 10/27/2022     Resolved Ambulatory Problems     Diagnosis Date Noted   • No Resolved Ambulatory Problems     Past Medical History:   Diagnosis Date   • Arthritis of neck ?   • Atheroembolism of right lower extremity (HCC)    • Baker's cyst    • Colon polyp    • CREST syndrome (HCC)    • Difficulty walking About 5 yrs ago   • Fracture of wrist    • Fractures    • GERD (gastroesophageal reflux disease)    • Hip arthrosis ? Maybe 7 or 8 years ago   • History of CT scan of abdomen 03/11/2011   • History of rheumatoid arthritis    • HL (hearing loss) About 1995   • Hyperlipidemia    • Hypertension    • Irritable bowel syndrome 1990s   • Knee swelling 5 or 6 years ago   • Low back pain    • Osteoarthritis    • Peripheral neuropathy Cannot remember   • Raynaud's disease    • Rheumatoid arthritis (HCC)    • Scleroderma (HCC)    • Shingles    • Sjogren's syndrome (HCC)    • Tear of meniscus of knee    • Ulcerative colitis (HCC)          PAST SURGICAL HISTORY  Past Surgical History:   Procedure Laterality Date   • CATARACT EXTRACTION, BILATERAL     • COLONOSCOPY  02/26/2016    tics, microscopic colitis,    • COLONOSCOPY  07/01/2011    sig tics, inflammation, polyp   • DILATATION AND CURETTAGE  1980   • EKOS CATHETER PLACEMENT N/A 03/23/2021    Procedure: AIF WITH RUN OFF OF RT. LEG, ROTATIONAL ATHERECTOMY OF RIGHT POPLITEAL ARTERY;  Surgeon: Gato Contreras MD;  Location: [facility] OR 18/19;  Service: Vascular;  Laterality: N/A;   • ENDOSCOPY N/A 12/03/2018    erythematous mucosa in stomach, path benign   • EYE SURGERY     • FLEXIBLE  SIGMOIDOSCOPY     • FRACTURE SURGERY     • HAND SURGERY     • JOINT REPLACEMENT     • KNEE ARTHROSCOPY Right     w/meniscal repair   • KNEE SURGERY Right    • TEETH EXTRACTION     • TOTAL KNEE ARTHROPLASTY Right 05/30/2018    Procedure: TOTAL KNEE ARTHROPLASTY;  Surgeon: Georges Jon MD;  Location: [facility] OR;  Service: Orthopedics   • TRANSLUMINAL ATHERECTOMY POPLITEAL ARTERY     • TRIGGER POINT INJECTION     • UPPER GASTROINTESTINAL ENDOSCOPY  03/14/2008    erythema   • VASCULAR SURGERY  Spring of 2020    Blood clot in an artery behind my right knee   • WRIST FRACTURE SURGERY Right    • WRIST SURGERY Right 2008    orif         FAMILY HISTORY  Family History   Problem Relation Age of Onset   • Ulcerative colitis Mother    • Migraines Mother    • Stroke Mother    • Hypertension Mother    • Thyroid disease Mother    • Skin cancer Mother    • Breast cancer Maternal Aunt    • Arthritis Father    • Heart attack Father    • Migraines Sister         Sister   • Skin cancer Sister    • Migraines Daughter    • Skin cancer Brother    • Malig Hyperthermia Neg Hx          SOCIAL HISTORY  Social History     Socioeconomic History   • Marital status: Single   • Number of children: 3   Tobacco Use   • Smoking status: Never   • Smokeless tobacco: Never   Vaping Use   • Vaping Use: Never used   Substance and Sexual Activity   • Alcohol use: Yes     Comment: I rarely have a glass of wine.   • Drug use: Never   • Sexual activity: Not Currently     Partners: Male     Comment: Not sexual active         ALLERGIES  Codeine, Penicillin g, and Sulfa antibiotics        REVIEW OF SYSTEMS  Review of Systems   Constitutional: Positive for fatigue. Negative for fever.   Respiratory: Negative for shortness of breath.    Cardiovascular: Negative for chest pain.   Neurological:        Gait has been worsened recently as per HPI   All other systems reviewed and are negative.           PHYSICAL EXAM  ED Triage Vitals   Temp Heart Rate Resp BP SpO2    03/27/23 1325 03/27/23 1325 03/27/23 1325 03/27/23 1341 03/27/23 1325   97.3 °F (36.3 °C) 101 16 136/80 96 %      Temp src Heart Rate Source Patient Position BP Location FiO2 (%)   03/27/23 1325 03/27/23 1325 03/27/23 1341 03/27/23 1341 --   Tympanic Monitor Sitting Left arm        Physical Exam    GENERAL: Alert and pleasant female who looks younger than stated age.  Triage vitals reviewed and are relatively benign.  HENT: nares patent, TMs unremarkable  EYES: no scleral icterus  CV: regular rhythm, regular rate-no murmur  RESPIRATORY: normal effort, clear to auscultation bilaterally  ABDOMEN: soft, nontender-no mass  MUSCULOSKELETAL: no deformity-no segment swelling or tenderness to palpation  NEURO: Strength sensation and coordination are grossly intact.  Speech and mentation are unremarkable.  [place]-Hallpike maneuver is unremarkable.  Patient is able to ambulate readily through the ED halls using her 4-prong cane.  There is some mild broad-based gait and mild ataxia.  Patient moves quickly and does not seem at risk.  SKIN: warm, dry      Vital signs and nursing notes reviewed.          LAB RESULTS  Recent Results (from the past 24 hour(s))   ECG 12 Lead ED Triage Standing Order; Weak / Dizzy / AMS    Collection Time: 03/27/23  1:45 PM   Result Value Ref Range    QT Interval 384 ms   Comprehensive Metabolic Panel    Collection Time: 03/27/23  1:51 PM    Specimen: Blood   Result Value Ref Range    Glucose 103 (H) 65 - 99 mg/dL    BUN 26 (H) 8 - 23 mg/dL    Creatinine 0.70 0.57 - 1.00 mg/dL    Sodium 138 136 - 145 mmol/L    Potassium 3.9 3.5 - 5.2 mmol/L    Chloride 102 98 - 107 mmol/L    CO2 25.0 22.0 - 29.0 mmol/L    Calcium 9.0 8.6 - 10.5 mg/dL    Total Protein 7.0 6.0 - 8.5 g/dL    Albumin 4.4 3.5 - 5.2 g/dL    ALT (SGPT) 26 1 - 33 U/L    AST (SGOT) 17 1 - 32 U/L    Alkaline Phosphatase 97 39 - 117 U/L    Total Bilirubin 0.2 0.0 - 1.2 mg/dL    Globulin 2.6 gm/dL    A/G Ratio 1.7 g/dL    BUN/Creatinine Ratio  37.1 (H) 7.0 - 25.0    Anion Gap 11.0 5.0 - 15.0 mmol/L    eGFR 83.8 >60.0 mL/min/1.73   Single High Sensitivity Troponin T    Collection Time: 03/27/23  1:51 PM    Specimen: Blood   Result Value Ref Range    HS Troponin T 13 (H) <10 ng/L   Magnesium    Collection Time: 03/27/23  1:51 PM    Specimen: Blood   Result Value Ref Range    Magnesium 2.3 1.6 - 2.4 mg/dL   Green Top (Gel)    Collection Time: 03/27/23  1:51 PM   Result Value Ref Range    Extra Tube Hold for add-ons.    Lavender Top    Collection Time: 03/27/23  1:51 PM   Result Value Ref Range    Extra Tube hold for add-on    Gold Top - SST    Collection Time: 03/27/23  1:51 PM   Result Value Ref Range    Extra Tube Hold for add-ons.    Light Blue Top    Collection Time: 03/27/23  1:51 PM   Result Value Ref Range    Extra Tube Hold for add-ons.    CBC Auto Differential    Collection Time: 03/27/23  1:51 PM    Specimen: Blood   Result Value Ref Range    WBC 13.91 (H) 3.40 - 10.80 10*3/mm3    RBC 4.02 3.77 - 5.28 10*6/mm3    Hemoglobin 12.9 12.0 - 15.9 g/dL    Hematocrit 38.6 34.0 - 46.6 %    MCV 96.0 79.0 - 97.0 fL    MCH 32.1 26.6 - 33.0 pg    MCHC 33.4 31.5 - 35.7 g/dL    RDW 12.4 12.3 - 15.4 %    RDW-SD 43.6 37.0 - 54.0 fl    MPV 10.2 6.0 - 12.0 fL    Platelets 226 140 - 450 10*3/mm3    Neutrophil % 91.5 (H) 42.7 - 76.0 %    Lymphocyte % 4.2 (L) 19.6 - 45.3 %    Monocyte % 3.8 (L) 5.0 - 12.0 %    Eosinophil % 0.0 (L) 0.3 - 6.2 %    Basophil % 0.1 0.0 - 1.5 %    Immature Grans % 0.4 0.0 - 0.5 %    Neutrophils, Absolute 12.74 (H) 1.70 - 7.00 10*3/mm3    Lymphocytes, Absolute 0.58 (L) 0.70 - 3.10 10*3/mm3    Monocytes, Absolute 0.53 0.10 - 0.90 10*3/mm3    Eosinophils, Absolute 0.00 0.00 - 0.40 10*3/mm3    Basophils, Absolute 0.01 0.00 - 0.20 10*3/mm3    Immature Grans, Absolute 0.05 0.00 - 0.05 10*3/mm3    nRBC 0.0 0.0 - 0.2 /100 WBC   Urinalysis With Microscopic If Indicated (No Culture) - Urine, Clean Catch    Collection Time: 03/27/23  3:25 PM     Specimen: Urine, Clean Catch   Result Value Ref Range    Color, UA Yellow Yellow, Straw    Appearance, UA Cloudy (A) Clear    pH, UA 5.5 5.0 - 8.0    Specific Gravity, UA 1.011 1.005 - 1.030    Glucose, UA Negative Negative    Ketones, UA Negative Negative    Bilirubin, UA Negative Negative    Blood, UA Negative Negative    Protein, UA Negative Negative    Leuk Esterase, UA Large (3+) (A) Negative    Nitrite, UA Positive (A) Negative    Urobilinogen, UA 0.2 E.U./dL 0.2 - 1.0 E.U./dL   Urinalysis, Microscopic Only - Urine, Clean Catch    Collection Time: 03/27/23  3:25 PM    Specimen: Urine, Clean Catch   Result Value Ref Range    RBC, UA 0-2 None Seen, 0-2 /HPF    WBC, UA Too Numerous to Count (A) None Seen, 0-2 /HPF    Bacteria, UA 4+ (A) None Seen /HPF    Squamous Epithelial Cells, UA 0-2 None Seen, 0-2 /HPF    Hyaline Casts, UA 3-6 None Seen /LPF    Methodology Automated Microscopy        Ordered the above labs and independently interpreted results. My findings will be discussed in the medical decision making section below        RADIOLOGY  XR Chest 1 View    Result Date: 3/27/2023  ONE VIEW PORTABLE CHEST  HISTORY: Weakness. Dizziness.  FINDINGS: The lungs are well-expanded and clear. The heart and hilar structures are within normal limits and there is no acute disease or change from 01/13/2021.  This report was finalized on 3/27/2023 2:00 PM by Dr. Edward Marsh M.D.        I ordered and independently reviewed the above noted radiographic studies.      I viewed images of chest x-ray which showed no active disease per my independent interpretation.    See radiologist's dictation for official interpretation.             PROCEDURES  Procedures          MEDICATIONS GIVEN IN ER  Medications   sodium chloride 0.9 % flush 10 mL (has no administration in time range)               MEDICAL DECISION MAKING, PROGRESS, and CONSULTS    All labs have been independently reviewed by me.  All radiology studies have been  reviewed by me and I have also reviewed the radiology report.   EKG's independently viewed and interpreted by me.  Discussion below represents my analysis of pertinent findings related to patient's condition, differential diagnosis, treatment plan and final disposition.      Additional sources:  - Discussed/ obtained information from independent historians: Son at bedside    - External (non-ED) record review: Please see documented above    - Chronic or social conditions impacting care: Chronic ataxia, age of 85    - Shared decision making: I discussed ED evaluation and treatment plan with patient who is in agreement.  Discussed at length with patient and son at bedside.  Also spoke with stroke neurologist Dr. Bryan House.  Examination and work-up is not suggestive of acute neurologic emergency.  Suspect that this is exacerbation of chronic ataxia related to recent urinary tract infection.  Patient is well-appearing and ambulating well on her own.  Do not think that she needs hospitalization or further work-up at this time.  We will treat with oral Keflex and hope to see improvement within the next 5 to 7 days.      Orders placed during this visit:  Orders Placed This Encounter   Procedures   • XR Chest 1 View   • [place] Draw   • Comprehensive Metabolic Panel   • Single High Sensitivity Troponin T   • Magnesium   • Urinalysis With Microscopic If Indicated (No Culture) - Urine, Clean Catch   • CBC Auto Differential   • Urinalysis, Microscopic Only - Urine, Clean Catch   • NPO Diet NPO Type: Strict NPO   • Undress & Gown   • Cardiac Monitoring   • Continuous Pulse Oximetry   • Vital Signs   • Orthostatic Blood Pressure   • Inpatient Neurology Consult Stroke   • Oxygen Therapy- Nasal Cannula; 2 LPM; Titrate for SPO2: 92%, Greater Than or Equal To   • POC Glucose Once   • ECG 12 Lead ED Triage Standing Order; Weak / Dizzy / AMS   • Insert Peripheral IV   • Fall Precautions   • CBC & Differential   • Green Top (Gel)   •  Lavender Top   • Gold Top - SST   • Light Blue Top           Differential diagnosis:    Please see as documented below in ED course      Independent interpretation of labs, radiology studies, and discussions with consultants:  ED Course as of 03/27/23 1609   Mon Mar 27, 2023   0632 I discussed treatment and evaluation this patient with the stroke neurologist Dr. Bryan House.  Patient has had pretty benign gait here in the ED.  She was able to walk down the hallway very briskly using her 4-prong cane.  Suspect that her dizziness again is likely multifactorial.  I do not see strong indicators suggest that this is acute stroke.  In discussion with Dr. Santana he does not feel that she needs emergent MRI or admission if she is walking well with a cane.  He feels that she could follow-up outpatient with neurology and may need outpatient physical therapy. [DB]   1437 MDM-this is a complicated female who has had ongoing ataxia.  She feels symptoms of been worsened since Thursday.  She was seen at urgent care and told that she may have vertigo.    On exam she is well-appearing.  Vitals are benign.  I do not see any obvious acute neurologic deficit.  When I do watch her walk she does have some mild ataxia but moves pretty darn well using a 4 pronged cane.    I discussed treatment management with the on-call stroke neurologist Dr. Bryan House.  We discussed possibility of MRI or more aggressive work-up.  Given patient's chronic ataxia and current findings we do not feel that MRI is indicated or his admission at this time.    Patient may need outpatient follow-up with neurology and may need outpatient physical therapy.  If symptoms were to significantly worsen we may need to consider further aggressive work-up including MRI of the brain and formal neurology consultation. [DB]   1438 EKG independently interpreted by me    Time 1:45 PM  Sinus 82  Normal P waves and ID interval  Normal axis, normal QRS  Unremarkable ST and T  wave    Not significantly changed when compared to 2021 [DB]   1547 Urinalysis is abnormal with 4+ bacteria and too numerous to count white cells.  I suspect there is a urinary tract infection.  Patient is well-appearing and afebrile.  I believe that she can be treated outpatient.  Suspect that her ataxias may be worsened with recent urinary tract infection rather than any new acute neurological emergency. [DB]      ED Course User Index  [DB] Gato Soto MD             I used full protective equipment while examining this patient.  This includes face mask, gloves and protective eyewear.  I washed my hands before entering the room and immediately upon leaving the room    DIAGNOSIS  Final diagnoses:   Ataxia   Urinary tract infection without hematuria, site unspecified   MGUS (monoclonal gammopathy of unknown significance)         DISPOSITION  DISCHARGE    Patient discharged in stable condition.    Reviewed implications of results, diagnosis, meds, responsibility to follow up, warning signs and symptoms of possible worsening, potential complications and reasons to return to ER, including worsening symptoms worsening.    Patient/Family voiced understanding of above instructions.    Discussed plan for discharge, as there is no emergent indication for admission. Patient referred to primary care provider for BP management due to today's BP. Pt/family is agreeable and understands need for follow up and repeat testing.  Pt is aware that discharge does not mean that nothing is wrong but it indicates no emergency is present that requires admission and they must continue care with follow-up as given below or physician of their choice.     FOLLOW-UP  Oren Rust Jr., MD  [address] [facility] [facility] [address]  [place] [address]  [phone]    In 1 week  If Not Better    Ralph Blancas MD  [address]  [place] [address]  [phone]    In 1 week  If Not Better         Medication List      New Prescriptions     cephalexin 500 MG capsule  Commonly known as: KEFLEX  Take 1 capsule by mouth 3 (Three) Times a Day.           Where to Get Your Medications      These medications were sent to [facility] [id number] - [place], [place] - [address] [facility]. & [address] - [phone]  - [phone] FX  [address], [facility] [address]    Phone: [phone]   · cephalexin 500 MG capsule                   Latest Documented Vital Signs:  As of 16:09 EDT  BP- 161/89 HR- 79 Temp- 97.8 °F (36.6 °C) (Oral) O2 sat- 96%              --    Please note that portions of this were completed with a voice recognition program.       Note Disclaimer: At [facility], we believe that sharing information builds trust and better relationships. You are receiving this note because you are receiving care at [facility] or recently visited. It is possible you will see health information before a provider has talked with you about it. This kind of information can be easy to misunderstand. To help you fully understand what it means for your health, we urge you to discuss this note with your provider.           Gato Soto MD  03/27/23 8155

## 2023-08-24 NOTE — PATIENT DISCUSSION
If no improvement after 2 weeks, Patient instructed to see  [facility] for further evaluation. no weight-bearing restrictions

## 2023-09-06 ENCOUNTER — COMPREHENSIVE EXAM (OUTPATIENT)
Dept: URBAN - METROPOLITAN AREA CLINIC 32 | Facility: CLINIC | Age: 73
End: 2023-09-06

## 2023-09-06 DIAGNOSIS — H43.813: ICD-10-CM

## 2023-09-06 DIAGNOSIS — H25.013: ICD-10-CM

## 2023-09-06 DIAGNOSIS — H25.13: ICD-10-CM

## 2023-09-06 PROCEDURE — 92014 COMPRE OPH EXAM EST PT 1/>: CPT

## 2023-09-06 PROCEDURE — 92015 DETERMINE REFRACTIVE STATE: CPT

## 2023-09-06 ASSESSMENT — KERATOMETRY
OD_AXISANGLE_DEGREES: 76
OS_K1POWER_DIOPTERS: 45.00
OS_K2POWER_DIOPTERS: 44.50
OS_AXISANGLE_DEGREES: 83
OS_AXISANGLE2_DEGREES: 173
OD_AXISANGLE2_DEGREES: 166
OD_K1POWER_DIOPTERS: 44.25
OD_K2POWER_DIOPTERS: 44.00

## 2023-09-06 ASSESSMENT — VISUAL ACUITY
OS_SC: 20/400
OD_CC: J1+
OS_CC: 20/30
OD_SC: 20/200
OS_CC: J1+/-2
OD_CC: 20/30
OD_GLARE: 20/40
OS_GLARE: 20/50
OS_SC: J16
OD_SC: J16

## 2023-09-06 ASSESSMENT — TONOMETRY
OS_IOP_MMHG: 17
OD_IOP_MMHG: 17

## 2024-02-23 NOTE — PATIENT DISCUSSION
Excellent postoperative appearance. February 23, 2024     Patient: Everotn Monique   YOB: 2018   Date of Visit: 2/23/2024       To Whom it May Concern:    Everton Monique was seen in my clinic on 2/23/2024. She may return to school on 2/26/2024 .    If you have any questions or concerns, please don't hesitate to call.         Sincerely,          RAPHAEL Malik        CC: No Recipients

## 2024-09-11 ENCOUNTER — COMPREHENSIVE EXAM (OUTPATIENT)
Dept: URBAN - METROPOLITAN AREA CLINIC 32 | Facility: CLINIC | Age: 74
End: 2024-09-11

## 2024-09-11 DIAGNOSIS — H25.13: ICD-10-CM

## 2024-09-11 DIAGNOSIS — H43.813: ICD-10-CM

## 2024-09-11 DIAGNOSIS — H10.13: ICD-10-CM

## 2024-09-11 DIAGNOSIS — H25.013: ICD-10-CM

## 2024-09-11 PROCEDURE — 92015 DETERMINE REFRACTIVE STATE: CPT

## 2024-09-11 PROCEDURE — 99214 OFFICE O/P EST MOD 30 MIN: CPT

## 2025-07-03 ENCOUNTER — CONSULTATION/EVALUATION (OUTPATIENT)
Age: 75
End: 2025-07-03

## 2025-07-03 DIAGNOSIS — H43.813: ICD-10-CM

## 2025-07-03 DIAGNOSIS — H35.373: ICD-10-CM

## 2025-07-03 DIAGNOSIS — H35.3131: ICD-10-CM

## 2025-07-03 DIAGNOSIS — H25.813: ICD-10-CM

## 2025-07-03 PROCEDURE — 92015 DETERMINE REFRACTIVE STATE: CPT

## 2025-07-03 PROCEDURE — 99204 OFFICE O/P NEW MOD 45 MIN: CPT

## 2025-07-03 PROCEDURE — 92134 CPTRZ OPH DX IMG PST SGM RTA: CPT

## 2025-07-22 ENCOUNTER — DIAGNOSTICS ONLY (OUTPATIENT)
Age: 75
End: 2025-07-22

## 2025-07-22 DIAGNOSIS — H25.813: ICD-10-CM

## 2025-07-22 PROCEDURE — 92136 OPHTHALMIC BIOMETRY: CPT

## 2025-07-22 PROCEDURE — 92499PMB8

## 2025-08-07 ENCOUNTER — PRE-OP/H&P (OUTPATIENT)
Age: 75
End: 2025-08-07

## 2025-08-07 DIAGNOSIS — H35.3131: ICD-10-CM

## 2025-08-07 DIAGNOSIS — H25.813: ICD-10-CM

## 2025-08-07 PROCEDURE — 99211HP PRE-OP

## 2025-08-07 PROCEDURE — 92250 FUNDUS PHOTOGRAPHY W/I&R: CPT

## 2025-08-13 ENCOUNTER — SURGERY/PROCEDURE (OUTPATIENT)
Age: 75
End: 2025-08-13

## 2025-08-13 DIAGNOSIS — H25.812: ICD-10-CM

## 2025-08-13 PROCEDURE — 66984 XCAPSL CTRC RMVL W/O ECP: CPT

## 2025-08-14 ENCOUNTER — POST-OP (OUTPATIENT)
Age: 75
End: 2025-08-14

## 2025-08-14 DIAGNOSIS — Z96.1: ICD-10-CM

## 2025-08-14 PROCEDURE — 99024 POSTOP FOLLOW-UP VISIT: CPT

## 2025-08-19 ENCOUNTER — POST-OP (OUTPATIENT)
Age: 75
End: 2025-08-19

## 2025-08-19 DIAGNOSIS — Z96.1: ICD-10-CM

## 2025-08-19 DIAGNOSIS — H25.811: ICD-10-CM

## 2025-08-19 PROCEDURE — 99024 POSTOP FOLLOW-UP VISIT: CPT
